# Patient Record
Sex: FEMALE | Race: WHITE | Employment: FULL TIME | URBAN - METROPOLITAN AREA
[De-identification: names, ages, dates, MRNs, and addresses within clinical notes are randomized per-mention and may not be internally consistent; named-entity substitution may affect disease eponyms.]

---

## 2017-09-25 ENCOUNTER — TELEPHONE (OUTPATIENT)
Dept: INTERNAL MEDICINE CLINIC | Facility: CLINIC | Age: 62
End: 2017-09-25

## 2017-09-25 NOTE — TELEPHONE ENCOUNTER
Incoming (mail or fax):  fax  Received from:  [facility]  Documentation given to:  triage

## 2017-09-27 ENCOUNTER — OFFICE VISIT (OUTPATIENT)
Dept: INTERNAL MEDICINE CLINIC | Facility: CLINIC | Age: 62
End: 2017-09-27

## 2017-09-27 VITALS
HEART RATE: 64 BPM | HEIGHT: 63 IN | RESPIRATION RATE: 12 BRPM | SYSTOLIC BLOOD PRESSURE: 100 MMHG | TEMPERATURE: 99 F | WEIGHT: 168 LBS | DIASTOLIC BLOOD PRESSURE: 74 MMHG | BODY MASS INDEX: 29.77 KG/M2

## 2017-09-27 DIAGNOSIS — Z00.00 PHYSICAL EXAM, ANNUAL: Primary | ICD-10-CM

## 2017-09-27 DIAGNOSIS — Z01.419 VISIT FOR PELVIC EXAM: ICD-10-CM

## 2017-09-27 DIAGNOSIS — Z78.0 POSTMENOPAUSAL: ICD-10-CM

## 2017-09-27 PROCEDURE — 87624 HPV HI-RISK TYP POOLED RSLT: CPT | Performed by: INTERNAL MEDICINE

## 2017-09-27 PROCEDURE — 99396 PREV VISIT EST AGE 40-64: CPT | Performed by: INTERNAL MEDICINE

## 2017-09-27 PROCEDURE — 88175 CYTOPATH C/V AUTO FLUID REDO: CPT | Performed by: INTERNAL MEDICINE

## 2017-09-27 RX ORDER — PHENOL 1.4 %
AEROSOL, SPRAY (ML) MUCOUS MEMBRANE NIGHTLY
COMMUNITY

## 2017-09-27 NOTE — PROGRESS NOTES
[facility]    CHIEF COMPLAINT: Patient presents with:  Routine Physical: 10/3/12 normal pap, no HPV        HPI:   Shashi Delacruz is a 63year old female who presents for a complete physical exam. Symptoms: denies discharge, itching, burning or density study 04/2008   • Night sweats     menopausal   • Numbness and tingling in hands     upon waking up   • Positive tuberculin    • Snoring    • Spontaneous pneumothorax at 29 yrs old   • Thyroid nodule     right, s/p fna      Past Surgical History: atraumatic, normocephalic,ears and throat are clear  EYES:PERRLA, conjunctiva are clear  NECK: supple,no adenopathy,no bruits  CHEST: no chest tenderness  LUNGS: clear to auscultation  CARDIO: nl s1 and s2, RRR without murmur  GI: good BS's,no masses, HSM

## 2019-02-12 ENCOUNTER — TELEPHONE (OUTPATIENT)
Dept: INTERNAL MEDICINE CLINIC | Facility: CLINIC | Age: 64
End: 2019-02-12

## 2019-02-12 NOTE — TELEPHONE ENCOUNTER
Mammogram report dated 2/12/19 received, 1 year screening recommended. HM updated. Routed to Dr Alva Hoskins for review.

## 2019-02-12 NOTE — TELEPHONE ENCOUNTER
Incoming (mail or fax):  fax  Received from:  [facility]  Documentation given to:  triage

## 2019-09-26 ENCOUNTER — OFFICE VISIT (OUTPATIENT)
Dept: INTERNAL MEDICINE CLINIC | Facility: CLINIC | Age: 64
End: 2019-09-26
Payer: COMMERCIAL

## 2019-09-26 VITALS
SYSTOLIC BLOOD PRESSURE: 100 MMHG | BODY MASS INDEX: 30.35 KG/M2 | HEIGHT: 62.75 IN | TEMPERATURE: 99 F | RESPIRATION RATE: 12 BRPM | WEIGHT: 169.13 LBS | HEART RATE: 72 BPM | DIASTOLIC BLOOD PRESSURE: 62 MMHG

## 2019-09-26 DIAGNOSIS — Z00.00 PHYSICAL EXAM, ANNUAL: Primary | ICD-10-CM

## 2019-09-26 DIAGNOSIS — Z78.0 POSTMENOPAUSAL: ICD-10-CM

## 2019-09-26 DIAGNOSIS — Z12.11 SCREENING FOR COLON CANCER: ICD-10-CM

## 2019-09-26 PROCEDURE — 99396 PREV VISIT EST AGE 40-64: CPT | Performed by: INTERNAL MEDICINE

## 2019-09-26 NOTE — PROGRESS NOTES
[facility]    CHIEF COMPLAINT: Patient presents with:  Routine Physical: 9/27/17-pap. 2/12/19-mammo. 10/24/14-colon,repeat 5 years.           HPI:   Carolina Spann is a 60year old female who presents for a complete physical exam. Symptoms: den nodule     right, s/p fna      Past Surgical History:   Procedure Laterality Date   • COLONOSCOPY  05/2009   • COLONOSCOPY  10/24/14    colon polyps, diverticulosis   • FNA (INDICATE SOURCE BELOW)      thyroid nodule   • TUBAL LIGATION        Family Histor murmur  GI: good BS's,no masses, HSM or tenderness  BREAST: no dominant or suspicious mass, no nipple discharge  GENITAL/URINARY:  External Genitalia:  General appearance; normal, Hair distribution; normal, Lesions absent, Urethral Meatus:  Size normal, Lo

## 2020-01-24 ENCOUNTER — OFFICE VISIT (OUTPATIENT)
Dept: FAMILY MEDICINE CLINIC | Facility: CLINIC | Age: 65
End: 2020-01-24
Payer: COMMERCIAL

## 2020-01-24 VITALS
DIASTOLIC BLOOD PRESSURE: 64 MMHG | HEART RATE: 85 BPM | BODY MASS INDEX: 27.6 KG/M2 | WEIGHT: 150 LBS | OXYGEN SATURATION: 98 % | SYSTOLIC BLOOD PRESSURE: 122 MMHG | TEMPERATURE: 100 F | HEIGHT: 62 IN

## 2020-01-24 DIAGNOSIS — J40 BRONCHITIS: Primary | ICD-10-CM

## 2020-01-24 PROCEDURE — 99213 OFFICE O/P EST LOW 20 MIN: CPT | Performed by: FAMILY MEDICINE

## 2020-01-24 RX ORDER — PREDNISONE 20 MG/1
TABLET ORAL
Qty: 10 TABLET | Refills: 0 | Status: SHIPPED | OUTPATIENT
Start: 2020-01-24 | End: 2020-06-11 | Stop reason: ALTCHOICE

## 2020-01-24 NOTE — PROGRESS NOTES
CHIEF COMPLAINT:   Patient presents with:  URI: when coughing throat hurts, coughing up flem, x 3 days. HPI:   Camilo Craig is a 60year old female who presents for cough for  3  days.   Cough started gradually and is described as tight and tyra fna      Social History:  Social History    Tobacco Use      Smoking status: Former Smoker        Years: 15.00      Smokeless tobacco: Never Used      Tobacco comment: 5-7 cigarettes, quit in 2009    Alcohol use: Yes      Comment: 3 drinks a week    Drug u Tylenol for pain if needed rather than ibuprofen (Motrin/Ibuprofen) or Aleve.     Use OTC meds for comfort as needed--  Use Benadryl at bedtime to reduce drainage and promote rest.  Zyrtec/Claritin/Allegra in the AM to reduce nasal drainage without sedation

## 2020-01-24 NOTE — PATIENT INSTRUCTIONS
Take prednisone-- 2 tabs once daily for 5 days. Take with food. While you are on steroids it is preferred that you take Tylenol for pain if needed rather than ibuprofen (Motrin/Ibuprofen) or Aleve.     Use OTC meds for comfort as needed--  Use Benadryl at

## 2020-02-07 ENCOUNTER — HOSPITAL ENCOUNTER (OUTPATIENT)
Dept: BONE DENSITY | Age: 65
Discharge: HOME | End: 2020-02-07
Attending: INTERNAL MEDICINE
Payer: COMMERCIAL

## 2020-02-07 DIAGNOSIS — Z78.0 POSTMENOPAUSAL: ICD-10-CM

## 2020-02-07 PROCEDURE — 77080 DXA BONE DENSITY AXIAL: CPT | Performed by: INTERNAL MEDICINE

## 2020-02-08 LAB
ABSOLUTE BASOPHILS: 74 CELLS/UL (ref 0–200)
ABSOLUTE EOSINOPHILS: 197 CELLS/UL (ref 15–500)
ABSOLUTE LYMPHOCYTES: 2280 CELLS/UL (ref 850–3900)
ABSOLUTE MONOCYTES: 828 CELLS/UL (ref 200–950)
ABSOLUTE NEUTROPHILS: 4822 CELLS/UL (ref 1500–7800)
ALBUMIN/GLOBULIN RATIO: 1.9 (CALC) (ref 1–2.5)
ALBUMIN: 4.2 G/DL (ref 3.6–5.1)
ALKALINE PHOSPHATASE: 41 U/L (ref 37–153)
ALT: 18 U/L (ref 6–29)
AST: 16 U/L (ref 10–35)
BASOPHILS: 0.9 %
BILIRUBIN, TOTAL: 0.4 MG/DL (ref 0.2–1.2)
BUN: 16 MG/DL (ref 7–25)
CALCIUM: 9.2 MG/DL (ref 8.6–10.4)
CARBON DIOXIDE: 29 MMOL/L (ref 20–32)
CHLORIDE: 106 MMOL/L (ref 98–110)
CHOL/HDLC RATIO: 3.2 (CALC)
CHOLESTEROL, TOTAL: 193 MG/DL
CREATININE: 0.73 MG/DL (ref 0.5–0.99)
EGFR IF AFRICN AM: 102 ML/MIN/1.73M2
EGFR IF NONAFRICN AM: 88 ML/MIN/1.73M2
EOSINOPHILS: 2.4 %
GLOBULIN: 2.2 G/DL (CALC) (ref 1.9–3.7)
GLUCOSE: 91 MG/DL (ref 65–99)
HDL CHOLESTEROL: 60 MG/DL
HEMATOCRIT: 44.7 % (ref 35–45)
HEMOGLOBIN: 14.9 G/DL (ref 11.7–15.5)
LDL-CHOLESTEROL: 115 MG/DL (CALC)
LYMPHOCYTES: 27.8 %
MCH: 29.2 PG (ref 27–33)
MCHC: 33.3 G/DL (ref 32–36)
MCV: 87.6 FL (ref 80–100)
MONOCYTES: 10.1 %
MPV: 10.9 FL (ref 7.5–12.5)
NEUTROPHILS: 58.8 %
NON-HDL CHOLESTEROL: 133 MG/DL (CALC)
PLATELET COUNT: 285 THOUSAND/UL (ref 140–400)
POTASSIUM: 5 MMOL/L (ref 3.5–5.3)
PROTEIN, TOTAL: 6.4 G/DL (ref 6.1–8.1)
RDW: 13.3 % (ref 11–15)
RED BLOOD CELL COUNT: 5.1 MILLION/UL (ref 3.8–5.1)
SODIUM: 142 MMOL/L (ref 135–146)
TRIGLYCERIDES: 79 MG/DL
TSH W/REFLEX TO FT4: 2.08 MIU/L (ref 0.4–4.5)
WHITE BLOOD CELL COUNT: 8.2 THOUSAND/UL (ref 3.8–10.8)

## 2020-05-31 ENCOUNTER — PATIENT MESSAGE (OUTPATIENT)
Dept: INTERNAL MEDICINE CLINIC | Facility: CLINIC | Age: 65
End: 2020-05-31

## 2020-06-01 NOTE — TELEPHONE ENCOUNTER
From: Turner Rucker  To: Destiny Maravilla MD  Sent: 5/31/2020 10:16 AM CDT  Subject: Non-Urgent Medical Question    Hi Dr Levi -  I would like to take a Covid antibody rest. I was so very sick towards the end of January, I went to the walk in clinic where t

## 2020-06-09 ENCOUNTER — OFFICE VISIT (OUTPATIENT)
Dept: FAMILY MEDICINE CLINIC | Facility: CLINIC | Age: 65
End: 2020-06-09
Payer: COMMERCIAL

## 2020-06-09 VITALS
WEIGHT: 175 LBS | BODY MASS INDEX: 32.2 KG/M2 | TEMPERATURE: 98 F | HEART RATE: 69 BPM | SYSTOLIC BLOOD PRESSURE: 113 MMHG | DIASTOLIC BLOOD PRESSURE: 65 MMHG | OXYGEN SATURATION: 99 % | HEIGHT: 62 IN

## 2020-06-09 DIAGNOSIS — Z91.038 ALLERGIC REACTION TO INSECT BITE: Primary | ICD-10-CM

## 2020-06-09 DIAGNOSIS — L03.116 CELLULITIS OF LEFT LOWER EXTREMITY: ICD-10-CM

## 2020-06-09 PROCEDURE — 99213 OFFICE O/P EST LOW 20 MIN: CPT | Performed by: PHYSICIAN ASSISTANT

## 2020-06-09 RX ORDER — PREDNISONE 10 MG/1
TABLET ORAL
Qty: 20 TABLET | Refills: 0 | Status: SHIPPED | OUTPATIENT
Start: 2020-06-09 | End: 2021-05-18

## 2020-06-09 RX ORDER — SULFAMETHOXAZOLE AND TRIMETHOPRIM 800; 160 MG/1; MG/1
1 TABLET ORAL 2 TIMES DAILY
Qty: 14 TABLET | Refills: 0 | Status: SHIPPED | OUTPATIENT
Start: 2020-06-09 | End: 2020-06-16

## 2020-06-09 NOTE — PATIENT INSTRUCTIONS
Patient Declined AVS    Verbal Instructions given      1. Bactrim  2. Prednisone  3. Follow up with PCP in 48 hours  4.  Worse to ER

## 2020-06-09 NOTE — PROGRESS NOTES
CHIEF COMPLAINT:     No chief complaint on file. HPI:   Turner Rucker is a 63year old female who presents with complaints of reaction to insect bite.   The patient reports 4 days ago the patient was bit by a bug on the back of her left ankle and l loss 2015   • History of bone density study 04/2008   • Night sweats     menopausal   • Numbness and tingling in hands     upon waking up   • Positive tuberculin    • Sleep disturbance 2007    Night sweats cause me to wake frequently   • Snoring    • Spont fluid, bony landmarks normal.    NOSE: nostrils patent, no discharge, nasal mucosa pink and not inflamed. No sinus tenderness. THROAT: oral mucosa pink, moist and intact. No visible dental caries. Posterior pharynx without erythema or exudates.   NECK: vanegas

## 2020-06-11 ENCOUNTER — OFFICE VISIT (OUTPATIENT)
Dept: INTERNAL MEDICINE CLINIC | Facility: CLINIC | Age: 65
End: 2020-06-11
Payer: COMMERCIAL

## 2020-06-11 VITALS
SYSTOLIC BLOOD PRESSURE: 104 MMHG | TEMPERATURE: 98 F | RESPIRATION RATE: 14 BRPM | OXYGEN SATURATION: 98 % | HEART RATE: 56 BPM | DIASTOLIC BLOOD PRESSURE: 64 MMHG | HEIGHT: 62 IN | WEIGHT: 172.81 LBS | BODY MASS INDEX: 31.8 KG/M2

## 2020-06-11 DIAGNOSIS — L03.116 CELLULITIS OF LEFT LOWER EXTREMITY: ICD-10-CM

## 2020-06-11 DIAGNOSIS — Z91.038 ALLERGIC REACTION TO INSECT BITE: Primary | ICD-10-CM

## 2020-06-11 PROCEDURE — 99213 OFFICE O/P EST LOW 20 MIN: CPT | Performed by: NURSE PRACTITIONER

## 2020-06-11 NOTE — PATIENT INSTRUCTIONS
Continue meds as directed. Take the prednisone in am with food.     Take the bactrim with food and take a OTC probiotic or eat yogurt    Monitor for side effects    F/U when your annual PE is due

## 2020-06-11 NOTE — PROGRESS NOTES
Rena Lester is a 63year old female. CHIEF COMPLAINT   [facility] follow up on bug bite allergy, cellulitis     HPI:   The patient states that on Sunday she had a bug bite to her left lower heel. she noticed it and scratched it a little bit.   That evening s • Multiple Vitamin (MULTIVITAMIN OR) Take  by mouth daily.         Past Medical History:   Diagnosis Date   • Body piercing Ears   • Carpal tunnel syndrome    • Colon polyps 05/05/09   • Depression    • Diverticulitis of colon 05/05/09    left   • Hearing l EXTREMITIES: no cyanosis, clubbing or edema. No edema noted to her bilateral lower extremities, no calf tenderness.   NEURO: Oriented times three,cranial nerves are grossly intact,motor and sensory are grossly intact          LABS:      Lab Results   [place] -Continue Bactrim as directed until the antibiotic is complete. She was instructed to take it with food and use either probiotic or eat yogurt to prevent diarrhea. She denies any side effects so far.   There is no drainage from the left heel wound but the

## 2020-06-30 ENCOUNTER — ORDER TRANSCRIPTION (OUTPATIENT)
Dept: ADMINISTRATIVE | Facility: HOSPITAL | Age: 65
End: 2020-06-30

## 2020-06-30 DIAGNOSIS — Z12.31 ENCOUNTER FOR SCREENING MAMMOGRAM FOR MALIGNANT NEOPLASM OF BREAST: Primary | ICD-10-CM

## 2020-07-15 ENCOUNTER — HOSPITAL ENCOUNTER (OUTPATIENT)
Dept: MAMMOGRAPHY | Age: 65
Discharge: HOME | End: 2020-07-15
Attending: INTERNAL MEDICINE
Payer: COMMERCIAL

## 2020-07-15 DIAGNOSIS — Z12.31 ENCOUNTER FOR SCREENING MAMMOGRAM FOR MALIGNANT NEOPLASM OF BREAST: ICD-10-CM

## 2020-07-15 PROCEDURE — 77067 SCR MAMMO BI INCL CAD: CPT | Performed by: INTERNAL MEDICINE

## 2020-07-15 PROCEDURE — 77063 BREAST TOMOSYNTHESIS BI: CPT | Performed by: INTERNAL MEDICINE

## 2020-08-05 ENCOUNTER — HOSPITAL ENCOUNTER (OUTPATIENT)
Dept: MAMMOGRAPHY | Facility: HOSPITAL | Age: 65
Discharge: HOME | End: 2020-08-05
Attending: INTERNAL MEDICINE
Payer: COMMERCIAL

## 2020-08-05 DIAGNOSIS — R92.2 INCONCLUSIVE MAMMOGRAM: ICD-10-CM

## 2020-08-05 PROCEDURE — 77061 BREAST TOMOSYNTHESIS UNI: CPT | Performed by: INTERNAL MEDICINE

## 2020-08-05 PROCEDURE — 77065 DX MAMMO INCL CAD UNI: CPT | Performed by: INTERNAL MEDICINE

## 2020-08-05 PROCEDURE — 76642 ULTRASOUND BREAST LIMITED: CPT | Performed by: INTERNAL MEDICINE

## 2020-11-22 ENCOUNTER — PATIENT MESSAGE (OUTPATIENT)
Dept: INTERNAL MEDICINE CLINIC | Facility: CLINIC | Age: 65
End: 2020-11-22

## 2020-11-23 NOTE — TELEPHONE ENCOUNTER
From: Reinier Self  To: Keisha Dixon MD  Sent: 11/22/2020 9:47 PM CST  Subject: Other    For records

## 2021-03-09 ENCOUNTER — PATIENT MESSAGE (OUTPATIENT)
Dept: INTERNAL MEDICINE CLINIC | Facility: CLINIC | Age: 66
End: 2021-03-09

## 2021-03-09 NOTE — TELEPHONE ENCOUNTER
From: Ruben Baer  To: Hung Carcamo MD  Sent: 3/9/2021 11:34 AM CST  Subject: Non-Urgent Medical Question    Good Morning- I would like to know if I am eligible to be considered at high risk, and be able to get a vaccine for Covid now, (not 67 yet) I

## 2021-03-10 NOTE — TELEPHONE ENCOUNTER
Not sure if that qualifies but she has a bmi of 31 so that would qualify her to get it sec to high risk condition of obesity.

## 2021-03-10 NOTE — TELEPHONE ENCOUNTER
Vnae [place],     It is unknown if you would qualify for a past pneumothorax since it is not a chronic lung condition. After reviewing your chart you would qualify for the vaccine based on the BMI category.    I ca

## 2021-05-18 ENCOUNTER — OFFICE VISIT (OUTPATIENT)
Dept: INTERNAL MEDICINE CLINIC | Facility: CLINIC | Age: 66
End: 2021-05-18
Payer: COMMERCIAL

## 2021-05-18 VITALS
DIASTOLIC BLOOD PRESSURE: 70 MMHG | HEART RATE: 72 BPM | BODY MASS INDEX: 31.52 KG/M2 | RESPIRATION RATE: 12 BRPM | HEIGHT: 62.5 IN | TEMPERATURE: 98 F | SYSTOLIC BLOOD PRESSURE: 110 MMHG | WEIGHT: 175.69 LBS

## 2021-05-18 DIAGNOSIS — Z00.00 PHYSICAL EXAM, ANNUAL: ICD-10-CM

## 2021-05-18 DIAGNOSIS — Z12.31 SCREENING MAMMOGRAM, ENCOUNTER FOR: Primary | ICD-10-CM

## 2021-05-18 DIAGNOSIS — Z23 NEED FOR VACCINATION: ICD-10-CM

## 2021-05-18 PROCEDURE — 90471 IMMUNIZATION ADMIN: CPT | Performed by: INTERNAL MEDICINE

## 2021-05-18 PROCEDURE — 3078F DIAST BP <80 MM HG: CPT | Performed by: INTERNAL MEDICINE

## 2021-05-18 PROCEDURE — 90715 TDAP VACCINE 7 YRS/> IM: CPT | Performed by: INTERNAL MEDICINE

## 2021-05-18 PROCEDURE — 3074F SYST BP LT 130 MM HG: CPT | Performed by: INTERNAL MEDICINE

## 2021-05-18 PROCEDURE — 99396 PREV VISIT EST AGE 40-64: CPT | Performed by: INTERNAL MEDICINE

## 2021-05-18 PROCEDURE — 3008F BODY MASS INDEX DOCD: CPT | Performed by: INTERNAL MEDICINE

## 2021-05-18 NOTE — PROGRESS NOTES
[facility]    CHIEF COMPLAINT: Patient presents with:  Routine Physical: 9/27/17-normal pap, neg HPV. 7/15/20-mammo.  10/24/14-Colon-repeat 10 years        HPI:   Toya Adams is a 61year old female who presents for a complete physical exam. Procedure Laterality Date   • COLONOSCOPY  05/2009   • COLONOSCOPY  10/24/14    colon polyps, diverticulosis   • COLONOSCOPY     • FNA (INDICATE SOURCE BELOW)      thyroid nodule   • BENSON BIOPSY STEREO NODULE 1 SITE LEFT (CPT=19081)      aprox 10 yrs ago throat are clear  EYES:PERRLA, conjunctiva are clear  NECK: supple,no adenopathy,no bruits  CHEST: no chest tenderness  LUNGS: clear to auscultation  CARDIO: nl s1 and s2, RRR without murmur  GI: good BS's,no masses, HSM or tenderness  BREAST: no dominant for a complete physical exam.   1. Screening mammogram, encounter for  - [facility] STEPHANIA 2D+3D SCREENING BILAT (CPT=77067/00954); Future    2. Physical exam, annual  Pap done 2017 negative with negative hpv. Pelvic and breast exam done today. Do labs.    mammo i

## 2021-06-04 ENCOUNTER — MED REC SCAN ONLY (OUTPATIENT)
Dept: INTERNAL MEDICINE CLINIC | Facility: CLINIC | Age: 66
End: 2021-06-04

## 2021-06-04 ENCOUNTER — LAB ENCOUNTER (OUTPATIENT)
Dept: LAB | Age: 66
End: 2021-06-04
Attending: INTERNAL MEDICINE
Payer: COMMERCIAL

## 2021-06-04 DIAGNOSIS — Z00.00 PHYSICAL EXAM, ANNUAL: ICD-10-CM

## 2021-06-04 PROCEDURE — 83036 HEMOGLOBIN GLYCOSYLATED A1C: CPT | Performed by: INTERNAL MEDICINE

## 2021-06-04 PROCEDURE — 80050 GENERAL HEALTH PANEL: CPT | Performed by: INTERNAL MEDICINE

## 2021-06-04 PROCEDURE — 80061 LIPID PANEL: CPT | Performed by: INTERNAL MEDICINE

## 2021-06-08 NOTE — PROGRESS NOTES
Spoke to pt. Made aware of results & recommendations. Pt states she already read the [facility] Result Comment. Pt voiced understanding. Noted pt only clicked into the CMP result note.   \"Patient Communication Seen by patient Neisha Eugene on 6/4/20

## 2021-10-12 ENCOUNTER — HOSPITAL ENCOUNTER (OUTPATIENT)
Dept: MAMMOGRAPHY | Age: 66
Discharge: HOME | End: 2021-10-12
Attending: INTERNAL MEDICINE
Payer: COMMERCIAL

## 2021-10-12 DIAGNOSIS — Z12.31 SCREENING MAMMOGRAM, ENCOUNTER FOR: ICD-10-CM

## 2021-10-12 PROCEDURE — 77067 SCR MAMMO BI INCL CAD: CPT | Performed by: INTERNAL MEDICINE

## 2021-10-12 PROCEDURE — 77063 BREAST TOMOSYNTHESIS BI: CPT | Performed by: INTERNAL MEDICINE

## 2021-10-15 ENCOUNTER — TELEPHONE (OUTPATIENT)
Dept: MAMMOGRAPHY | Facility: HOSPITAL | Age: 66
End: 2021-10-15

## 2021-10-15 ENCOUNTER — HOSPITAL ENCOUNTER (OUTPATIENT)
Dept: MAMMOGRAPHY | Facility: HOSPITAL | Age: 66
Discharge: HOME | End: 2021-10-15
Attending: INTERNAL MEDICINE
Payer: COMMERCIAL

## 2021-10-15 DIAGNOSIS — R92.2 INCONCLUSIVE MAMMOGRAM: ICD-10-CM

## 2021-10-15 PROCEDURE — 77065 DX MAMMO INCL CAD UNI: CPT | Performed by: INTERNAL MEDICINE

## 2021-10-15 PROCEDURE — 77061 BREAST TOMOSYNTHESIS UNI: CPT | Performed by: INTERNAL MEDICINE

## 2021-10-15 NOTE — TELEPHONE ENCOUNTER
Patient here for breast imaging. Left breast 1 site stereotactic biopsy is recommended by Dr Napoleon Quinteros. Patient left before speaking to RN. Message left for patient to call back for prescreening and education.

## 2021-10-15 NOTE — TELEPHONE ENCOUNTER
This [facility] RN phoned pt re left breast 1 site stereotactic biopsy recommendation by Dr. Jorge Valdivia for calcifications. Procedure reviewed and all questions answered. Reviewed educational handouts.   On the day of the biopsy, pt instructed to take Tylbeverly

## 2021-10-27 ENCOUNTER — HOSPITAL ENCOUNTER (OUTPATIENT)
Dept: MAMMOGRAPHY | Facility: HOSPITAL | Age: 66
Discharge: HOME | End: 2021-10-27
Attending: INTERNAL MEDICINE
Payer: COMMERCIAL

## 2021-10-27 DIAGNOSIS — R92.1 BREAST CALCIFICATIONS: ICD-10-CM

## 2021-10-27 PROCEDURE — 19499 UNLISTED PROCEDURE BREAST: CPT | Performed by: INTERNAL MEDICINE

## 2021-10-27 PROCEDURE — 88305 TISSUE EXAM BY PATHOLOGIST: CPT | Performed by: INTERNAL MEDICINE

## 2021-10-29 ENCOUNTER — PATIENT MESSAGE (OUTPATIENT)
Dept: INTERNAL MEDICINE CLINIC | Facility: CLINIC | Age: 66
End: 2021-10-29

## 2021-10-29 DIAGNOSIS — R92.8 ABNORMAL MAMMOGRAM OF LEFT BREAST: Primary | ICD-10-CM

## 2021-10-29 NOTE — IMAGING NOTE
[place] verified with Dr. Bina Antoine. Pt notified of recent breast bx results. Pt denies any issues with biopsy site--no tenderness, drainage or redness.   Pt informed will need 6 month f/u on left breast. Pt verbalized understanding and emotional support p

## 2021-11-02 NOTE — TELEPHONE ENCOUNTER
From: Adin Rai  To: Moise Lundberg MD  Sent: 10/29/2021 4:24 PM CDT  Subject: Follow up mammogram     Hi- Just saw your note (from Tobias) inmy chart saying to follow up with a mammogram in one year.  The breast nurse at [facility] said I should follow up

## 2021-11-02 NOTE — TELEPHONE ENCOUNTER
Per breast biopsy result note, our office advised pt to follow up with mammogram in 12 months. Pt states was advised to repeat cornelia in 6 months. Per mammography dept 10/29/21 note, rec is 6 months.    Please advise if updated order needs to be placed for 6

## 2021-11-02 NOTE — TELEPHONE ENCOUNTER
Per mammogram report addendem after biopsy results were back they recommended repeat imaging in 10/2022 which would indicate a year (see results under mammogram in imaging).  They recommended 6 months when they called her, I don't know which one is accurate

## 2021-11-02 NOTE — TELEPHONE ENCOUNTER
Spoke to [facility]. States that 6 months IS recommended. She will send for an addendum.   FYI to Dr. Jennifer Garcia

## 2021-11-11 NOTE — TELEPHONE ENCOUNTER
LM with Kyleigh Austin to follow up on addendum. Biopsy still says follow up in 1 year, yet per [place] and what pt was told at [facility], pt should follow up in 6 months.

## 2021-11-18 NOTE — TELEPHONE ENCOUNTER
To Claude Billow,    Following up on addendum to note follow-up due in 6 months as pt was verbally advised, instead of 1 year.

## 2021-11-23 NOTE — TELEPHONE ENCOUNTER
Following up to Estrella's routing message from 5 days ago. Any update? Thanks! Brad Milligan RN 5 days ago     BN    This report is still out for an addendum. We will follow up with the Radiologist today.     Message text

## 2022-06-04 ENCOUNTER — TELEPHONE (OUTPATIENT)
Dept: INTERNAL MEDICINE CLINIC | Facility: CLINIC | Age: 67
End: 2022-06-04

## 2022-06-04 NOTE — TELEPHONE ENCOUNTER
Care Everywhere Records Received    Updated Care Everywhere: yes     Date of Service: 6/4/22    From What Facility: [facility]     Speciality: ENT Dr Author Leach    Type of Record: consult     Document Placed:Dr Ellsworth office in folder

## 2022-06-06 ENCOUNTER — HOSPITAL ENCOUNTER (OUTPATIENT)
Dept: MAMMOGRAPHY | Facility: HOSPITAL | Age: 67
Discharge: HOME | End: 2022-06-06
Attending: INTERNAL MEDICINE
Payer: MEDICARE

## 2022-06-06 DIAGNOSIS — R92.8 ABNORMAL MAMMOGRAM OF LEFT BREAST: ICD-10-CM

## 2022-06-06 PROCEDURE — 77065 DX MAMMO INCL CAD UNI: CPT | Performed by: INTERNAL MEDICINE

## 2022-06-06 PROCEDURE — 77061 BREAST TOMOSYNTHESIS UNI: CPT | Performed by: INTERNAL MEDICINE

## 2022-06-27 ENCOUNTER — OFFICE VISIT (OUTPATIENT)
Dept: INTERNAL MEDICINE CLINIC | Facility: CLINIC | Age: 67
End: 2022-06-27
Payer: MEDICARE

## 2022-06-27 VITALS
RESPIRATION RATE: 12 BRPM | DIASTOLIC BLOOD PRESSURE: 66 MMHG | TEMPERATURE: 98 F | BODY MASS INDEX: 30.62 KG/M2 | HEIGHT: 62.5 IN | WEIGHT: 170.63 LBS | SYSTOLIC BLOOD PRESSURE: 100 MMHG | HEART RATE: 64 BPM

## 2022-06-27 DIAGNOSIS — Z13.220 ENCOUNTER FOR LIPID SCREENING FOR CARDIOVASCULAR DISEASE: ICD-10-CM

## 2022-06-27 DIAGNOSIS — Z00.00 ENCOUNTER FOR ANNUAL HEALTH EXAMINATION: ICD-10-CM

## 2022-06-27 DIAGNOSIS — Z13.6 ENCOUNTER FOR LIPID SCREENING FOR CARDIOVASCULAR DISEASE: ICD-10-CM

## 2022-06-27 DIAGNOSIS — Z01.419 VISIT FOR PELVIC EXAM: ICD-10-CM

## 2022-06-27 DIAGNOSIS — Z12.31 ENCOUNTER FOR SCREENING MAMMOGRAM FOR BREAST CANCER: ICD-10-CM

## 2022-06-27 DIAGNOSIS — R73.03 PRE-DIABETES: ICD-10-CM

## 2022-06-27 DIAGNOSIS — Z23 NEED FOR VACCINATION: ICD-10-CM

## 2022-06-27 DIAGNOSIS — Z11.59 NEED FOR HEPATITIS C SCREENING TEST: ICD-10-CM

## 2022-06-27 DIAGNOSIS — E04.1 THYROID NODULE: ICD-10-CM

## 2022-07-11 LAB — HPV I/H RISK 1 DNA SPEC QL NAA+PROBE: NEGATIVE

## 2022-07-13 ENCOUNTER — LAB ENCOUNTER (OUTPATIENT)
Dept: LAB | Age: 67
End: 2022-07-13
Attending: INTERNAL MEDICINE
Payer: MEDICARE

## 2022-07-13 DIAGNOSIS — Z11.59 NEED FOR HEPATITIS C SCREENING TEST: ICD-10-CM

## 2022-07-13 DIAGNOSIS — E04.1 THYROID NODULE: ICD-10-CM

## 2022-07-13 DIAGNOSIS — R73.03 PRE-DIABETES: ICD-10-CM

## 2022-07-13 LAB
ALBUMIN SERPL-MCNC: 3.7 G/DL (ref 3.4–5)
ALBUMIN/GLOB SERPL: 1 {RATIO} (ref 1–2)
ALP LIVER SERPL-CCNC: 51 U/L
ALT SERPL-CCNC: 28 U/L
ANION GAP SERPL CALC-SCNC: 6 MMOL/L (ref 0–18)
AST SERPL-CCNC: 19 U/L (ref 15–37)
BILIRUB SERPL-MCNC: 0.5 MG/DL (ref 0.1–2)
BUN BLD-MCNC: 10 MG/DL (ref 7–18)
CALCIUM BLD-MCNC: 9.3 MG/DL (ref 8.5–10.1)
CHLORIDE SERPL-SCNC: 107 MMOL/L (ref 98–112)
CHOLEST SERPL-MCNC: 192 MG/DL (ref ?–200)
CO2 SERPL-SCNC: 25 MMOL/L (ref 21–32)
CREAT BLD-MCNC: 0.76 MG/DL
EST. AVERAGE GLUCOSE BLD GHB EST-MCNC: 120 MG/DL (ref 68–126)
FASTING PATIENT LIPID ANSWER: YES
FASTING STATUS PATIENT QL REPORTED: YES
GLOBULIN PLAS-MCNC: 3.6 G/DL (ref 2.8–4.4)
GLUCOSE BLD-MCNC: 106 MG/DL (ref 70–99)
HBA1C MFR BLD: 5.8 % (ref ?–5.7)
HCV AB SERPL QL IA: NONREACTIVE
HDLC SERPL-MCNC: 65 MG/DL (ref 40–59)
LDLC SERPL CALC-MCNC: 113 MG/DL (ref ?–100)
NONHDLC SERPL-MCNC: 127 MG/DL (ref ?–130)
OSMOLALITY SERPL CALC.SUM OF ELEC: 285 MOSM/KG (ref 275–295)
POTASSIUM SERPL-SCNC: 4.2 MMOL/L (ref 3.5–5.1)
PROT SERPL-MCNC: 7.3 G/DL (ref 6.4–8.2)
SODIUM SERPL-SCNC: 138 MMOL/L (ref 136–145)
TRIGL SERPL-MCNC: 75 MG/DL (ref 30–149)
TSI SER-ACNC: 1.71 MIU/ML (ref 0.36–3.74)
VLDLC SERPL CALC-MCNC: 13 MG/DL (ref 0–30)

## 2022-07-13 PROCEDURE — 84443 ASSAY THYROID STIM HORMONE: CPT

## 2022-07-13 PROCEDURE — 86803 HEPATITIS C AB TEST: CPT

## 2022-07-13 PROCEDURE — 80061 LIPID PANEL: CPT | Performed by: INTERNAL MEDICINE

## 2022-07-13 PROCEDURE — 36415 COLL VENOUS BLD VENIPUNCTURE: CPT

## 2022-07-13 PROCEDURE — 83036 HEMOGLOBIN GLYCOSYLATED A1C: CPT

## 2022-07-13 PROCEDURE — 80053 COMPREHEN METABOLIC PANEL: CPT | Performed by: INTERNAL MEDICINE

## 2022-07-14 ENCOUNTER — TELEPHONE (OUTPATIENT)
Dept: INTERNAL MEDICINE CLINIC | Facility: CLINIC | Age: 67
End: 2022-07-14

## 2022-07-14 NOTE — TELEPHONE ENCOUNTER
Patient returned call regarding test results. Triage unavailable to take call and patient unavailable the rest of the day. Patient will call back 7/15/22.

## 2022-07-27 PROBLEM — K57.30 DIVERTICULOSIS, SIGMOID: Status: ACTIVE | Noted: 2022-07-27

## 2022-07-27 PROBLEM — Z86.0101 HISTORY OF ADENOMATOUS POLYP OF COLON: Status: ACTIVE | Noted: 2022-07-27

## 2022-07-27 PROBLEM — K64.8 INTERNAL HEMORRHOIDS: Status: ACTIVE | Noted: 2022-07-27

## 2022-07-27 PROBLEM — Z86.010 HISTORY OF ADENOMATOUS POLYP OF COLON: Status: ACTIVE | Noted: 2022-07-27

## 2022-08-19 ENCOUNTER — PATIENT MESSAGE (OUTPATIENT)
Dept: INTERNAL MEDICINE CLINIC | Facility: CLINIC | Age: 67
End: 2022-08-19

## 2022-08-23 NOTE — TELEPHONE ENCOUNTER
From: Carlos Wilson  To: Carmelo Fair MD  Sent: 8/19/2022 1:22 PM CDT  Subject: USThyroid Results from [facility] 6/20/22    Hi- I have merged [facility] my chart with [facility]. Can you let Dr Yesi Rose know these test results are there and that Dr Geeta Berg wants the US repeated in one year to see if the nodules have changed before doing a FNA. Thank you!
Left detailed message per hipaa informing pt to follow-wp w dr Elvi Gaines as recommended in 1 year   Advised to call the office with any additional questions or concerns
Noted. Reviewed us results which recommend a biopsy. I see her note that dr. Frankey Kindler recommended a 1 year follow up. I will defer to his judgement on this as he is managing it. She should follow up with dr. Frankey Kindler as recommended.
Please see pt message below as she has linked her [facility] account so we can view her ultrasound results.
26.5

## 2022-10-19 ENCOUNTER — HOSPITAL ENCOUNTER (OUTPATIENT)
Dept: MAMMOGRAPHY | Age: 67
Discharge: HOME | End: 2022-10-19
Attending: INTERNAL MEDICINE
Payer: MEDICARE

## 2022-10-19 DIAGNOSIS — Z12.31 ENCOUNTER FOR SCREENING MAMMOGRAM FOR BREAST CANCER: ICD-10-CM

## 2022-10-19 PROCEDURE — 77063 BREAST TOMOSYNTHESIS BI: CPT | Performed by: INTERNAL MEDICINE

## 2022-10-19 PROCEDURE — 77067 SCR MAMMO BI INCL CAD: CPT | Performed by: INTERNAL MEDICINE

## 2022-11-02 ENCOUNTER — HOSPITAL ENCOUNTER (OUTPATIENT)
Dept: MAMMOGRAPHY | Facility: HOSPITAL | Age: 67
Discharge: HOME | End: 2022-11-02
Attending: INTERNAL MEDICINE
Payer: MEDICARE

## 2022-11-02 DIAGNOSIS — R92.2 INCONCLUSIVE MAMMOGRAM: ICD-10-CM

## 2022-11-02 PROCEDURE — 77065 DX MAMMO INCL CAD UNI: CPT | Performed by: INTERNAL MEDICINE

## 2022-11-02 PROCEDURE — 77061 BREAST TOMOSYNTHESIS UNI: CPT | Performed by: INTERNAL MEDICINE

## 2022-11-02 PROCEDURE — 76642 ULTRASOUND BREAST LIMITED: CPT | Performed by: INTERNAL MEDICINE

## 2022-11-03 ENCOUNTER — TELEPHONE (OUTPATIENT)
Dept: INTERNAL MEDICINE CLINIC | Facility: CLINIC | Age: 67
End: 2022-11-03

## 2022-11-03 DIAGNOSIS — R92.8 ABNORMAL MAMMOGRAM: Primary | ICD-10-CM

## 2022-11-03 NOTE — TELEPHONE ENCOUNTER
Incoming (mail or fax):  fax  [facility] from:  [facility]  Documentation given to:  Triage in basket     Needs to be signed and returned

## 2022-11-15 ENCOUNTER — MED REC SCAN ONLY (OUTPATIENT)
Dept: INTERNAL MEDICINE CLINIC | Facility: CLINIC | Age: 67
End: 2022-11-15

## 2022-11-16 ENCOUNTER — HOSPITAL ENCOUNTER (OUTPATIENT)
Dept: MRI IMAGING | Facility: HOSPITAL | Age: 67
Discharge: HOME | End: 2022-11-16
Attending: INTERNAL MEDICINE
Payer: MEDICARE

## 2022-11-16 DIAGNOSIS — R92.2 INCONCLUSIVE MAMMOGRAM: ICD-10-CM

## 2022-11-16 PROCEDURE — A9575 INJ GADOTERATE MEGLUMI 0.1ML: HCPCS | Performed by: INTERNAL MEDICINE

## 2022-11-16 PROCEDURE — 77049 MRI BREAST C-+ W/CAD BI: CPT | Performed by: INTERNAL MEDICINE

## 2022-11-16 RX ORDER — GADOTERATE MEGLUMINE 376.9 MG/ML
20 INJECTION INTRAVENOUS
Status: COMPLETED | OUTPATIENT
Start: 2022-11-16 | End: 2022-11-16

## 2022-11-16 RX ADMIN — GADOTERATE MEGLUMINE 14 ML: 376.9 INJECTION INTRAVENOUS at 19:05:00

## 2022-11-17 ENCOUNTER — TELEPHONE (OUTPATIENT)
Dept: INTERNAL MEDICINE CLINIC | Facility: CLINIC | Age: 67
End: 2022-11-17

## 2022-11-17 DIAGNOSIS — R92.8 OTHER ABNORMAL AND INCONCLUSIVE FINDINGS ON DIAGNOSTIC IMAGING OF BREAST: ICD-10-CM

## 2022-11-17 DIAGNOSIS — R93.89 ABNORMAL MRI: Primary | ICD-10-CM

## 2022-11-17 NOTE — TELEPHONE ENCOUNTER
Incoming (mail or fax):  fax  [facility]  [facility]   Documentation given to:  Triage in basket     Needs to be signed and returned

## 2022-11-18 DIAGNOSIS — R93.89 ABNORMAL MRI: ICD-10-CM

## 2022-11-18 DIAGNOSIS — R92.8 ABNORMAL MAMMOGRAM OF LEFT BREAST: Primary | ICD-10-CM

## 2022-11-18 NOTE — TELEPHONE ENCOUNTER
Received order request from [facility] for ultrasound due to abnormal mri   Mri results attached   It looks like an order was already placed for this   lmtcb

## 2022-11-18 NOTE — TELEPHONE ENCOUNTER
Kathrin Ross called back  She stated they need another order for us left breast, they have to keep asking since the mri was already done   Order in dr liza pepe for review   Pended it electronically as well

## 2022-11-28 ENCOUNTER — HOSPITAL ENCOUNTER (OUTPATIENT)
Dept: MAMMOGRAPHY | Facility: HOSPITAL | Age: 67
Discharge: HOME | End: 2022-11-28
Attending: INTERNAL MEDICINE
Payer: MEDICARE

## 2022-11-28 DIAGNOSIS — R93.89 ABNORMAL MRI: ICD-10-CM

## 2022-11-28 DIAGNOSIS — R92.8 ABNORMAL MAMMOGRAM OF LEFT BREAST: ICD-10-CM

## 2022-11-28 PROCEDURE — 76642 ULTRASOUND BREAST LIMITED: CPT | Performed by: INTERNAL MEDICINE

## 2022-11-30 DIAGNOSIS — R92.8 ABNORMAL MAMMOGRAM OF LEFT BREAST: Primary | ICD-10-CM

## 2022-11-30 DIAGNOSIS — R91.1 PULMONARY NODULE 1 CM OR GREATER IN DIAMETER: Primary | ICD-10-CM

## 2022-12-01 ENCOUNTER — TELEPHONE (OUTPATIENT)
Dept: INTERNAL MEDICINE CLINIC | Facility: CLINIC | Age: 67
End: 2022-12-01

## 2022-12-01 NOTE — TELEPHONE ENCOUNTER
Talked to pt and pt's ingrown hair is raised, red, painful to touch and draining blood and pus. Pt noticed  it 3 days ago and doing sitz bath and applying warm compress and it feels better today.  Please advise

## 2022-12-01 NOTE — TELEPHONE ENCOUNTER
Pt called and wanted to discuss possible ingrown hair on pelvic area not sure if its something else and how to treat.  Please advise

## 2022-12-08 ENCOUNTER — TELEPHONE (OUTPATIENT)
Dept: INTERNAL MEDICINE CLINIC | Facility: CLINIC | Age: 67
End: 2022-12-08

## 2022-12-08 NOTE — TELEPHONE ENCOUNTER
[facility] testing dept called to give order clarification. Cosme  stated it ct chest should just be with contrast. Without does not make sense.  Please advise

## 2022-12-12 ENCOUNTER — HOSPITAL ENCOUNTER (OUTPATIENT)
Dept: CT IMAGING | Facility: HOSPITAL | Age: 67
Discharge: HOME | End: 2022-12-12
Attending: INTERNAL MEDICINE
Payer: MEDICARE

## 2022-12-12 DIAGNOSIS — R91.1 PULMONARY NODULE 1 CM OR GREATER IN DIAMETER: ICD-10-CM

## 2022-12-12 LAB
CREAT BLD-MCNC: 0.7 MG/DL
GFR SERPLBLD BASED ON 1.73 SQ M-ARVRAT: 96 ML/MIN/1.73M2 (ref 60–?)

## 2022-12-12 PROCEDURE — 82565 ASSAY OF CREATININE: CPT

## 2022-12-12 PROCEDURE — 71260 CT THORAX DX C+: CPT | Performed by: INTERNAL MEDICINE

## 2022-12-27 ENCOUNTER — OFFICE VISIT (OUTPATIENT)
Dept: SURGERY | Facility: CLINIC | Age: 67
End: 2022-12-27
Payer: MEDICARE

## 2022-12-27 VITALS
RESPIRATION RATE: 18 BRPM | DIASTOLIC BLOOD PRESSURE: 75 MMHG | SYSTOLIC BLOOD PRESSURE: 137 MMHG | BODY MASS INDEX: 30 KG/M2 | HEART RATE: 76 BPM | OXYGEN SATURATION: 97 % | WEIGHT: 164 LBS

## 2022-12-27 DIAGNOSIS — R92.8 ABNORMAL MAMMOGRAM OF LEFT BREAST: Primary | ICD-10-CM

## 2022-12-27 PROCEDURE — 99204 OFFICE O/P NEW MOD 45 MIN: CPT | Performed by: SURGERY

## 2022-12-29 ENCOUNTER — OFFICE VISIT (OUTPATIENT)
Facility: CLINIC | Age: 67
End: 2022-12-29
Payer: MEDICARE

## 2022-12-29 VITALS
SYSTOLIC BLOOD PRESSURE: 108 MMHG | HEIGHT: 62 IN | RESPIRATION RATE: 16 BRPM | DIASTOLIC BLOOD PRESSURE: 60 MMHG | HEART RATE: 77 BPM | BODY MASS INDEX: 30.73 KG/M2 | OXYGEN SATURATION: 98 % | WEIGHT: 167 LBS

## 2022-12-29 DIAGNOSIS — J92.9 PLEURAL PLAQUE WITHOUT ASBESTOS: Primary | ICD-10-CM

## 2022-12-29 DIAGNOSIS — R06.09 DOE (DYSPNEA ON EXERTION): ICD-10-CM

## 2022-12-29 DIAGNOSIS — R91.8 LUNG NODULE, MULTIPLE: ICD-10-CM

## 2022-12-29 DIAGNOSIS — Z72.0 TOBACCO ABUSE: ICD-10-CM

## 2022-12-29 DIAGNOSIS — J43.9 PULMONARY EMPHYSEMA, UNSPECIFIED EMPHYSEMA TYPE (HCC): ICD-10-CM

## 2022-12-29 PROCEDURE — 99204 OFFICE O/P NEW MOD 45 MIN: CPT | Performed by: INTERNAL MEDICINE

## 2022-12-29 RX ORDER — ALBUTEROL SULFATE 90 UG/1
2 AEROSOL, METERED RESPIRATORY (INHALATION) EVERY 6 HOURS PRN
Qty: 1 EACH | Refills: 11 | Status: SHIPPED | OUTPATIENT
Start: 2022-12-29

## 2022-12-29 NOTE — PATIENT INSTRUCTIONS
Please obtain a CT chest scan and breathing test (pulmonary function test) in mid March 2023. Start albuterol 2 puffs every 6 hours as needed for your breathing. You can use this preemptively such as before exercise or bike ride or other activities. If you feel your breathing is not helped with the albuterol, we can consider other inhalers such as spiriva or incruse.   Follow up with me after the tests are done - March 2023

## 2023-03-17 ENCOUNTER — HOSPITAL ENCOUNTER (OUTPATIENT)
Dept: CT IMAGING | Facility: HOSPITAL | Age: 68
Discharge: HOME | End: 2023-03-17
Attending: INTERNAL MEDICINE
Payer: MEDICARE

## 2023-03-17 DIAGNOSIS — R91.8 LUNG NODULE, MULTIPLE: ICD-10-CM

## 2023-03-17 PROCEDURE — 71250 CT THORAX DX C-: CPT | Performed by: INTERNAL MEDICINE

## 2023-03-21 ENCOUNTER — OFFICE VISIT (OUTPATIENT)
Facility: CLINIC | Age: 68
End: 2023-03-21
Payer: MEDICARE

## 2023-03-21 VITALS
OXYGEN SATURATION: 98 % | DIASTOLIC BLOOD PRESSURE: 58 MMHG | HEART RATE: 73 BPM | RESPIRATION RATE: 16 BRPM | SYSTOLIC BLOOD PRESSURE: 100 MMHG

## 2023-03-21 DIAGNOSIS — J92.9 PLEURAL PLAQUE WITHOUT ASBESTOS: ICD-10-CM

## 2023-03-21 DIAGNOSIS — R06.09 DOE (DYSPNEA ON EXERTION): ICD-10-CM

## 2023-03-21 DIAGNOSIS — Z72.0 TOBACCO ABUSE: ICD-10-CM

## 2023-03-21 DIAGNOSIS — R91.8 LUNG NODULES: Primary | ICD-10-CM

## 2023-03-21 DIAGNOSIS — J44.9 CHRONIC OBSTRUCTIVE PULMONARY DISEASE, UNSPECIFIED COPD TYPE (HCC): ICD-10-CM

## 2023-03-21 PROCEDURE — 99213 OFFICE O/P EST LOW 20 MIN: CPT | Performed by: INTERNAL MEDICINE

## 2023-03-21 NOTE — PATIENT INSTRUCTIONS
Plan on a repeat CT chest in September/October 2023 - if you get sick (cold, sinus infection or other infection), then postpone the scan. Call with questions/concerns  Use albuterol inhaler 2 puffs every 6 hours as needed for your breathing. Follow up in September/October 2023 after the CT scan.    Call with questions/concerns

## 2023-07-11 ENCOUNTER — HOSPITAL ENCOUNTER (OUTPATIENT)
Dept: MAMMOGRAPHY | Facility: HOSPITAL | Age: 68
Discharge: HOME | End: 2023-07-11
Attending: SURGERY
Payer: MEDICARE

## 2023-07-11 DIAGNOSIS — R92.8 ABNORMAL MAMMOGRAM OF LEFT BREAST: ICD-10-CM

## 2023-07-11 PROCEDURE — 77062 BREAST TOMOSYNTHESIS BI: CPT | Performed by: SURGERY

## 2023-07-11 PROCEDURE — 76642 ULTRASOUND BREAST LIMITED: CPT | Performed by: SURGERY

## 2023-07-11 PROCEDURE — 77066 DX MAMMO INCL CAD BI: CPT | Performed by: SURGERY

## 2023-08-28 ENCOUNTER — OFFICE VISIT (OUTPATIENT)
Dept: SURGERY | Facility: CLINIC | Age: 68
End: 2023-08-28
Payer: MEDICARE

## 2023-08-28 VITALS
RESPIRATION RATE: 16 BRPM | HEART RATE: 89 BPM | SYSTOLIC BLOOD PRESSURE: 126 MMHG | BODY MASS INDEX: 30.4 KG/M2 | DIASTOLIC BLOOD PRESSURE: 86 MMHG | TEMPERATURE: 97 F | WEIGHT: 165.19 LBS | HEIGHT: 62 IN | OXYGEN SATURATION: 97 %

## 2023-08-28 DIAGNOSIS — N63.21 MASS OF UPPER OUTER QUADRANT OF LEFT BREAST: Primary | ICD-10-CM

## 2023-08-28 PROCEDURE — 99213 OFFICE O/P EST LOW 20 MIN: CPT | Performed by: SURGERY

## 2023-08-29 PROBLEM — N63.21 MASS OF UPPER OUTER QUADRANT OF LEFT BREAST: Status: ACTIVE | Noted: 2023-08-29

## 2023-10-13 ENCOUNTER — OFFICE VISIT (OUTPATIENT)
Dept: INTERNAL MEDICINE CLINIC | Facility: CLINIC | Age: 68
End: 2023-10-13
Payer: MEDICARE

## 2023-10-13 ENCOUNTER — LAB ENCOUNTER (OUTPATIENT)
Dept: LAB | Age: 68
End: 2023-10-13
Attending: INTERNAL MEDICINE
Payer: MEDICARE

## 2023-10-13 VITALS
SYSTOLIC BLOOD PRESSURE: 110 MMHG | HEIGHT: 62.5 IN | BODY MASS INDEX: 29.21 KG/M2 | HEART RATE: 80 BPM | DIASTOLIC BLOOD PRESSURE: 70 MMHG | RESPIRATION RATE: 12 BRPM | TEMPERATURE: 98 F | WEIGHT: 162.81 LBS

## 2023-10-13 DIAGNOSIS — R73.03 PRE-DIABETES: ICD-10-CM

## 2023-10-13 DIAGNOSIS — E04.1 THYROID NODULE: ICD-10-CM

## 2023-10-13 DIAGNOSIS — Z00.00 ENCOUNTER FOR ANNUAL HEALTH EXAMINATION: Primary | ICD-10-CM

## 2023-10-13 LAB
ALBUMIN SERPL-MCNC: 3.9 G/DL (ref 3.4–5)
ALBUMIN/GLOB SERPL: 1.1 {RATIO} (ref 1–2)
ALP LIVER SERPL-CCNC: 47 U/L
ALT SERPL-CCNC: 35 U/L
ANION GAP SERPL CALC-SCNC: 7 MMOL/L (ref 0–18)
AST SERPL-CCNC: 15 U/L (ref 15–37)
BILIRUB SERPL-MCNC: 0.4 MG/DL (ref 0.1–2)
BUN BLD-MCNC: 12 MG/DL (ref 7–18)
CALCIUM BLD-MCNC: 9.2 MG/DL (ref 8.5–10.1)
CHLORIDE SERPL-SCNC: 105 MMOL/L (ref 98–112)
CO2 SERPL-SCNC: 27 MMOL/L (ref 21–32)
CREAT BLD-MCNC: 0.78 MG/DL
EGFRCR SERPLBLD CKD-EPI 2021: 84 ML/MIN/1.73M2 (ref 60–?)
FASTING STATUS PATIENT QL REPORTED: NO
GLOBULIN PLAS-MCNC: 3.5 G/DL (ref 2.8–4.4)
GLUCOSE BLD-MCNC: 78 MG/DL (ref 70–99)
OSMOLALITY SERPL CALC.SUM OF ELEC: 287 MOSM/KG (ref 275–295)
POTASSIUM SERPL-SCNC: 3.8 MMOL/L (ref 3.5–5.1)
PROT SERPL-MCNC: 7.4 G/DL (ref 6.4–8.2)
SODIUM SERPL-SCNC: 139 MMOL/L (ref 136–145)
TSI SER-ACNC: 1.53 MIU/ML (ref 0.36–3.74)

## 2023-10-13 PROCEDURE — 83036 HEMOGLOBIN GLYCOSYLATED A1C: CPT

## 2023-10-13 PROCEDURE — 80053 COMPREHEN METABOLIC PANEL: CPT

## 2023-10-13 PROCEDURE — 36415 COLL VENOUS BLD VENIPUNCTURE: CPT

## 2023-10-13 PROCEDURE — 84443 ASSAY THYROID STIM HORMONE: CPT

## 2023-10-13 RX ORDER — KETOCONAZOLE 20 MG/G
CREAM TOPICAL
COMMUNITY
Start: 2023-05-02

## 2023-10-15 LAB — HGBA1C: 5.7 %

## 2023-10-20 ENCOUNTER — HOSPITAL ENCOUNTER (OUTPATIENT)
Dept: CT IMAGING | Facility: HOSPITAL | Age: 68
Discharge: HOME | End: 2023-10-20
Attending: INTERNAL MEDICINE

## 2023-10-20 ENCOUNTER — RT VISIT (OUTPATIENT)
Dept: RESPIRATORY THERAPY | Facility: HOSPITAL | Age: 68
End: 2023-10-20
Attending: INTERNAL MEDICINE
Payer: MEDICARE

## 2023-10-20 DIAGNOSIS — R91.8 LUNG NODULES: ICD-10-CM

## 2023-10-20 DIAGNOSIS — J44.9 CHRONIC OBSTRUCTIVE PULMONARY DISEASE, UNSPECIFIED COPD TYPE (HCC): ICD-10-CM

## 2023-10-20 PROCEDURE — 94726 PLETHYSMOGRAPHY LUNG VOLUMES: CPT

## 2023-10-20 PROCEDURE — 94729 DIFFUSING CAPACITY: CPT

## 2023-10-20 PROCEDURE — 94060 EVALUATION OF WHEEZING: CPT

## 2023-10-20 PROCEDURE — 71250 CT THORAX DX C-: CPT | Performed by: INTERNAL MEDICINE

## 2023-10-24 ENCOUNTER — OFFICE VISIT (OUTPATIENT)
Facility: CLINIC | Age: 68
End: 2023-10-24

## 2023-10-24 VITALS
SYSTOLIC BLOOD PRESSURE: 120 MMHG | HEIGHT: 62 IN | WEIGHT: 165 LBS | DIASTOLIC BLOOD PRESSURE: 62 MMHG | RESPIRATION RATE: 16 BRPM | HEART RATE: 72 BPM | BODY MASS INDEX: 30.36 KG/M2 | OXYGEN SATURATION: 97 %

## 2023-10-24 DIAGNOSIS — R91.8 LUNG NODULE, MULTIPLE: Primary | ICD-10-CM

## 2023-10-24 DIAGNOSIS — J92.9 PLEURAL PLAQUE WITHOUT ASBESTOS: ICD-10-CM

## 2023-10-24 DIAGNOSIS — F17.210 NICOTINE DEPENDENCE, CIGARETTES, UNCOMPLICATED: ICD-10-CM

## 2023-10-24 DIAGNOSIS — J44.9 CHRONIC OBSTRUCTIVE PULMONARY DISEASE, UNSPECIFIED COPD TYPE (HCC): ICD-10-CM

## 2023-10-24 DIAGNOSIS — Z72.0 TOBACCO ABUSE: ICD-10-CM

## 2023-10-24 PROCEDURE — 99214 OFFICE O/P EST MOD 30 MIN: CPT | Performed by: INTERNAL MEDICINE

## 2023-10-24 NOTE — PROCEDURES
Pulmonary Function Test:   Findings:  Spirometry: FEV1 is 1.74L, 81% predicted. FVC is 2.54L, 92% predicted and FEV1/ FVC ratio is 0.69. There is no significant bronchodilator response after administration of albuterol. The flow-volume loop demonstrates an obstructive pattern. MVV is 42, 53%predicted  Lung Volumes: The TLC is 4.76L, 103% predicted. The residual volume 2.16L, 113% predicted. Diffusion Capacity:  The diffusion capacity is 11.0 or 60% predicted and 72% predicted when corrected for alveolar volume. Impression:  There is mild airway obstruction on spirometry and visualized on flow-volume loop. There is no significant bronchodilator response, but this does not preclude treatment with bronchodilators. [place] MVV is reduced that  may indicate insufficient neuromuscular reserve, abnormal respiratory mechanics, or an inadequate effort, clinical correlation is suggested    Lung volumes show TLC  within normal limits. Despite the absence of airway obstruction, there is evidence of air trapping (residual volume of 42L,53% predicted). Despite the absence of airway obstruction, there is evidence of hyperinflation (total lung capacity of 2.16L,113% predicted). Diffusion capacity is moderately reduced  improves to normal range when considering alveolar volume. This may represent a normal finding but can be seen in emphysema, interstitial lung disease, pulmonary vascular disease (such as pulmonary hypertension), atelectasis and anemia. If not already performed, would suggest further evaluation as determined clinically. There are no previous pulmonary function tests available for comparison.      Joe Rodriguez MD

## 2023-10-24 NOTE — PATIENT INSTRUCTIONS
Obtain a CT scan in/around October 20th 2024  Continue to use albuterol 2 puffs every 6 hours as needed for your breathing or cough  Let me know if you have any respiratory issues/concerns

## 2024-04-16 ENCOUNTER — PATIENT MESSAGE (OUTPATIENT)
Dept: INTERNAL MEDICINE CLINIC | Facility: CLINIC | Age: 69
End: 2024-04-16

## 2024-04-16 NOTE — TELEPHONE ENCOUNTER
From: Carla Malhotra  To: Niyah Schaeffer  Sent: 4/16/2024 12:24 PM CDT  Subject: Vac update     January 19, 2024 - Covid (Pfizer)  October 24, 2023 - Flu  October 24, 2023 - RSV    Please add to my chart    Thanks!

## 2024-07-31 ENCOUNTER — HOSPITAL ENCOUNTER (OUTPATIENT)
Dept: MAMMOGRAPHY | Facility: HOSPITAL | Age: 69
Discharge: HOME | End: 2024-07-31
Attending: SURGERY
Payer: MEDICARE

## 2024-07-31 DIAGNOSIS — N63.21 MASS OF UPPER OUTER QUADRANT OF LEFT BREAST: ICD-10-CM

## 2024-07-31 PROCEDURE — 77062 BREAST TOMOSYNTHESIS BI: CPT | Performed by: SURGERY

## 2024-07-31 PROCEDURE — 77066 DX MAMMO INCL CAD BI: CPT | Performed by: SURGERY

## 2024-09-24 ENCOUNTER — HOSPITAL ENCOUNTER (OUTPATIENT)
Dept: CT IMAGING | Facility: HOSPITAL | Age: 69
Discharge: HOME | End: 2024-09-24
Attending: INTERNAL MEDICINE
Payer: MEDICARE

## 2024-09-24 DIAGNOSIS — Z72.0 TOBACCO ABUSE: ICD-10-CM

## 2024-09-24 DIAGNOSIS — F17.210 NICOTINE DEPENDENCE, CIGARETTES, UNCOMPLICATED: ICD-10-CM

## 2024-09-24 PROCEDURE — 71271 CT THORAX LUNG CANCER SCR C-: CPT | Performed by: INTERNAL MEDICINE

## 2024-10-13 NOTE — PROGRESS NOTES
Subjective:   Carla Malhotra is a 69 year old female who presents for a Subsequent Annual Wellness visit (Pt already had Initial Annual Wellness) and med check.     Pap done 6/2022 negative with negative hpv. Breast and pelvic done today.   Mammo done 7/2024. Done by dr. Fernández.   Colonoscopy done 7/2022, repeat in 3 years.   Dexa done 2/2020 was normal. Ordered.     Up to date tdap, shingrix, prevnar 20, rsv.   Get covid booster at her local pharmacy.     [facility] flowsheet reviewed.   No falls.   Memory testing normal.   Mood has been stable. No depression.   Seeing eye doctor yearly.      Seeing pulm for abnormal chest ct, emphysema. On albuterol prn.   Just had a ct chest which was stable.      Thyroid nodule: has not seen dr. Myrick at [facility]. She thinks she may like to see someone here.      Pre diabetes: working on diet and exercise.      Seeing dr. Fernández for abnormal mammo findings. Mammo per dr. Fernández.     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 Gets this about once a year usually when she is in the sun for too long and she was just in [place]. Uses an antiviral cream for it usually but it is old from 2017. Would like a new order.     History/Other:   Fall Risk Assessment:   She has been screened for Falls and is low risk.      Cognitive Assessment:   She had a completely normal cognitive assessment - see flowsheet entries       Functional Ability/Status:   Carla Malhotra has a completely normal functional assessment. See flowsheet for details.      Depression Screening (PHQ):  PHQ-2 SCORE: 0  , done 10/14/2024   Last [place] Suicide Screening on 10/15/2024 was No Risk.       Advanced Directives:   She does NOT have a Living Will. [Do you have a living will?: (Patient-Rptd) No]  She does NOT have a Power of  for Health Care. [Do you have a healthcare power of ?: No (packet given)]  Discussed with patient and provided information.        Patient Active  Problem List   Diagnosis    Colon polyps    Diverticulitis of colon    Depression    Thyroid nodule    Snoring    Carpal tunnel syndrome    Numbness and tingling in hands    History of adenomatous polyp of colon    Internal hemorrhoids    Diverticulosis    Mass of upper outer quadrant of left breast    Chronic obstructive pulmonary disease (HCC)     Allergies:  She is allergic to ceclor [cefaclor] and codeine.    Current Medications:  Outpatient Medications Marked as Taking for the 10/15/24 encounter (Office Visit) with Niyah Schaeffer MD   Medication Sig    valACYclovir 500 MG Oral Tab Take 1 tablet (500 mg total) by mouth every 12 (twelve) hours.    acyclovir 5 % External Ointment Apply 1 Application topically in the morning, at noon, and at bedtime.    ketoconazole 2 % External Cream APPLY TO IN BETWEEN BREAST TWICE DAILY AS NEEDED    tretinoin 0.025 % External Cream every other day.    albuterol 108 (90 Base) MCG/ACT Inhalation Aero Soln Inhale 2 puffs into the lungs every 6 (six) hours as needed for Wheezing or Shortness of Breath.    BIOTIN OR Take by mouth daily.    Calcium Carbonate-Vit D-Min (CALCIUM 1200 OR) Take by mouth daily.    TURMERIC OR Take by mouth daily.    Melatonin 10 MG Oral Tab Take by mouth nightly as needed.    Cyanocobalamin (VITAMIN B 12 OR) Take 2,000 mg by mouth daily.    omega-3 fatty acids (FISH OIL) 1000 MG Oral Cap Take 1,000 mg by mouth daily.    Ascorbic Acid (VITAMIN C) 1000 MG Oral Tab Take 1 tablet (1,000 mg total) by mouth daily.    Multiple Vitamin (MULTIVITAMIN OR) Take  by mouth daily.       Medical History:  She  has a past medical history of ALCOHOL USE, Allergic rhinitis (2000), Body piercing (Ears), Carpal tunnel syndrome, Colon polyps (05/05/2009), Depression, Diverticulitis of colon (05/05/2009), Hearing loss (2015), History of bone density study (04/2008), Night sweats, Numbness and tingling in hands, Positive tuberculin, Sleep disturbance (2007), Snoring, Spontaneous  pneumothorax (at 32 yrs old), Thyroid nodule, and Wears glasses (Glasses contacts 1970).  Surgical History:  She  has a past surgical history that includes tubal ligation; colonoscopy (2009); fna (indicate source below); colonoscopy (10/24/2014); colonoscopy; tonsillectomy (1970); cornelia biopsy stereo nodule 1 site left (cpt=19081); cornelia biopsy stereo nodule 1 site left (cpt=19081) (10/27/2021); thoracoscopy surg w pleurodesis (Left);  (, ); and other surgical history ().   Family History:  Her family history includes Cancer in her father; Heart Attack in her mother; alcoholic in her father; esophageal ca in her father; etoh abuse in an other family member.  Social History:  She  reports that she has quit smoking. Her smoking use included cigarettes. She has never used smokeless tobacco. She reports current alcohol use of about 6.0 standard drinks of alcohol per week. She reports that she does not use drugs.    Tobacco:  She smoked tobacco in the past but quit greater than 12 months ago.  Social History     Tobacco Use   Smoking Status Former    Current packs/day: 0.00    Types: Cigarettes   Smokeless Tobacco Never   Tobacco Comments    5-7 cigarettes, quit in         CAGE Alcohol Screen:   CAGE screening score of 0 on 10/14/2024, showing low risk of alcohol abuse.      Patient Care Team:  Niyah Schaeffer MD as PCP - General  [place], MD Yovani as Consulting Physician (SURGERY, GENERAL)  William Matute MD as Consulting Physician (GASTROENTEROLOGY)  Shane Edward as Consulting Physician (OTOLARYNGOLOGY)  Reyna Schreiber MD (DERMATOLOGY)    Review of Systems  See hpi    Objective:   Physical Exam  General appearance: alert, appears stated age, and cooperative  Lungs: clear to auscultation bilaterally  Breasts:  symmetric, no masses, no tenderness, no nipple discharge  Heart: S1, S2 normal, no murmur, click, rub or gallop, regular rate and rhythm  Abdomen: soft, non-tender; bowel sounds normal; no  masses,  no organomegaly  GENITAL/URINARY:  External Genitalia:  General appearance; normal, Hair distribution; normal, Lesions absent, Urethral Meatus:  Size normal, Location normal, Lesions absent, Prolapse absent, Vagina:  General appearance normal, Lesions absent, Pelvic support normal, Cervix:  General appearance normal, Discharge absent, Tenderness absent, Enlargement absent, Uterus:  Masses absent, Adnexa:  Masses absent, Tenderness absent, Anus/Perineum:  Lesions absent and Masses absent  No pap today.    Extremities:  no edema, muscle strength normal.   Skin: crusty vesicular rash on her chin, mostly crusted over now.    /74 (BP Location: Right arm, Patient Position: Sitting, Cuff Size: adult)   Pulse 80   Temp 97.5 °F (36.4 °C) (Temporal)   Resp 16   Ht 5' 2.25\" (1.581 m)   Wt 164 lb (74.4 kg)   Breastfeeding No   BMI 29.76 kg/m²  Estimated body mass index is 29.76 kg/m² as calculated from the following:    Height as of this encounter: 5' 2.25\" (1.581 m).    Weight as of this encounter: 164 lb (74.4 kg).    Medicare Hearing Assessment:   Hearing Screening    Screening Method: Finger Rub  Finger Rub Result: Pass (Comment: has hearing aids)               Assessment & Plan:   Carla Malhotra is a 69 year old female who presents for a Medicare Assessment.     1. Encounter for annual health examination  Pap done 6/2022 negative with negative hpv. Breast and pelvic done today.   Mammo done 7/2024. Done by dr. Fernández.   Colonoscopy done 7/2022, repeat in 3 years.   Dexa done 2/2020 was normal. Ordered.     Up to date tdap, shingrix, prevnar 20, rsv.   Get covid booster at her local pharmacy.     [facility] flowsheet reviewed.   No falls.   Memory testing normal.   Mood has been stable. No depression.   Seeing eye doctor yearly.      2. Pulmonary emphysema, unspecified emphysema type (HCC)  Continue albuterol prn   follow up with pulm.     3. Thyroid nodule  Urged to see ent.   - ENT - INTERNAL    4.  Pre-diabetes  Continue low carb diet. Regular exercise.   - Hemoglobin A1C; Future  - Comp Metabolic Panel (14) [E]; Future    5. Cold sore  Can try oral valtrex next time she has this.   Rx also done for acyclovir cream.   - valACYclovir 500 MG Oral Tab; Take 1 tablet (500 mg total) by mouth every 12 (twelve) hours.  Dispense: 6 tablet; Refill: 1  - acyclovir 5 % External Ointment; Apply 1 Application topically in the morning, at noon, and at bedtime.  Dispense: 15 g; Refill: 0    6. Postmenopausal  - XR DEXA BONE DENSITOMETRY (CPT=77080); Future    7. Screening breast exam  Normal exam today.     8. Gyne exam  Pelvic done. Normal.       The patient indicates understanding of these issues and agrees to the plan.  Reinforced healthy diet, lifestyle, and exercise.      Return in about 1 year (around 10/15/2025) for annual wellness visit, med check.     Niyah Schaeffer MD, 10/13/2024     Supplementary Documentation:   General Health:  In the past six months, have you lost more than 10 pounds without trying?: (Patient-Rptd) 2 - No  Has your appetite been poor?: (Patient-Rptd) No  Type of Diet: (Patient-Rptd) Balanced  How does the patient maintain a good energy level?: (Patient-Rptd) Daily Walks  How would you describe your daily physical activity?: (Patient-Rptd) Light  How would you describe your current health state?: (Patient-Rptd) Good  How do you maintain positive mental well-being?: (Patient-Rptd) Social Interaction  On a scale of 0 to 10, with 0 being no pain and 10 being severe pain, what is your pain level?: (Patient-Rptd) 0 - (None)  In the past six months, have you experienced urine leakage?: (Patient-Rptd) 0-No  At any time do you feel concerned for the safety/well-being of yourself and/or your children, in your home or elsewhere?: (Patient-Rptd) No  Have you had any immunizations at another office such as Influenza, Hepatitis B, Tetanus, or Pneumococcal?: (Patient-Rptd) No    Health Maintenance   Topic Date  Due    DEXA Scan  04/07/2022    Annual Depression Screening  01/01/2024    Fall Risk Screening (Annual)  01/01/2024    COVID-19 Vaccine (8 - 2023-24 season) 09/01/2024    Influenza Vaccine (1) 10/01/2024    Annual Physical  10/13/2024    Colorectal Cancer Screening  07/27/2025    Mammogram  07/31/2025    Pap Smear  06/27/2027    Pneumococcal Vaccine: 65+ Years  Completed    Zoster Vaccines  Completed

## 2024-10-15 ENCOUNTER — TELEPHONE (OUTPATIENT)
Dept: INTERNAL MEDICINE CLINIC | Facility: CLINIC | Age: 69
End: 2024-10-15

## 2024-10-15 ENCOUNTER — OFFICE VISIT (OUTPATIENT)
Dept: INTERNAL MEDICINE CLINIC | Facility: CLINIC | Age: 69
End: 2024-10-15
Payer: MEDICARE

## 2024-10-15 VITALS
WEIGHT: 164 LBS | BODY MASS INDEX: 29.8 KG/M2 | SYSTOLIC BLOOD PRESSURE: 110 MMHG | HEART RATE: 80 BPM | HEIGHT: 62.25 IN | DIASTOLIC BLOOD PRESSURE: 74 MMHG | TEMPERATURE: 98 F | RESPIRATION RATE: 16 BRPM

## 2024-10-15 DIAGNOSIS — Z78.0 POSTMENOPAUSAL: ICD-10-CM

## 2024-10-15 DIAGNOSIS — B00.1 COLD SORE: ICD-10-CM

## 2024-10-15 DIAGNOSIS — Z00.00 ENCOUNTER FOR ANNUAL HEALTH EXAMINATION: Primary | ICD-10-CM

## 2024-10-15 DIAGNOSIS — Z01.419 ENCOUNTER FOR GYNECOLOGICAL EXAMINATION (GENERAL) (ROUTINE) WITHOUT ABNORMAL FINDINGS: ICD-10-CM

## 2024-10-15 DIAGNOSIS — R73.03 PRE-DIABETES: ICD-10-CM

## 2024-10-15 DIAGNOSIS — J43.9 PULMONARY EMPHYSEMA, UNSPECIFIED EMPHYSEMA TYPE (HCC): ICD-10-CM

## 2024-10-15 DIAGNOSIS — E04.1 THYROID NODULE: ICD-10-CM

## 2024-10-15 DIAGNOSIS — Z12.39 SCREENING BREAST EXAMINATION: ICD-10-CM

## 2024-10-15 PROCEDURE — 99214 OFFICE O/P EST MOD 30 MIN: CPT | Performed by: INTERNAL MEDICINE

## 2024-10-15 PROCEDURE — G0439 PPPS, SUBSEQ VISIT: HCPCS | Performed by: INTERNAL MEDICINE

## 2024-10-15 RX ORDER — ACYCLOVIR 50 MG/G
1 OINTMENT TOPICAL 3 TIMES DAILY
Qty: 15 G | Refills: 0 | Status: SHIPPED | OUTPATIENT
Start: 2024-10-15

## 2024-10-15 RX ORDER — VALACYCLOVIR HYDROCHLORIDE 500 MG/1
500 TABLET, FILM COATED ORAL EVERY 12 HOURS SCHEDULED
Qty: 6 TABLET | Refills: 1 | Status: SHIPPED | OUTPATIENT
Start: 2024-10-15

## 2024-10-15 NOTE — TELEPHONE ENCOUNTER
Incoming (mail or fax):  fax  Received from:  [facility]  Documentation given to:  triage in    Prior auth for acyclovir

## 2024-10-16 NOTE — TELEPHONE ENCOUNTER
Called pharmacy and they said we could not change the form of the Rx . PA initiated and all questions answered.

## 2024-10-17 NOTE — TELEPHONE ENCOUNTER
Rec approval for acyclovir 5% ointment. See below. Sent for scanning. Rec from [facility] ([facility]). [facility] sent notifying patient.

## 2024-10-17 NOTE — TELEPHONE ENCOUNTER
Incoming (mail or fax):  Fax  Received from:  [employer]  Documentation given to:  Triage incoming    Approval for acyclovir ointment

## 2024-10-22 ENCOUNTER — OFFICE VISIT (OUTPATIENT)
Dept: SURGERY | Facility: CLINIC | Age: 69
End: 2024-10-22
Payer: MEDICARE

## 2024-10-22 VITALS
BODY MASS INDEX: 29.81 KG/M2 | HEIGHT: 62 IN | HEART RATE: 82 BPM | WEIGHT: 162 LBS | RESPIRATION RATE: 18 BRPM | DIASTOLIC BLOOD PRESSURE: 78 MMHG | SYSTOLIC BLOOD PRESSURE: 117 MMHG | OXYGEN SATURATION: 97 % | TEMPERATURE: 97 F

## 2024-10-22 DIAGNOSIS — Z98.890 HISTORY OF LEFT BREAST BIOPSY: Primary | ICD-10-CM

## 2024-10-22 DIAGNOSIS — N63.21 MASS OF UPPER OUTER QUADRANT OF LEFT BREAST: ICD-10-CM

## 2024-10-22 PROCEDURE — 99213 OFFICE O/P EST LOW 20 MIN: CPT | Performed by: SURGERY

## 2024-10-23 PROBLEM — Z98.890 HISTORY OF LEFT BREAST BIOPSY: Status: ACTIVE | Noted: 2024-10-23

## 2024-10-23 NOTE — PROGRESS NOTES
Breast Surgery Surveillance Visit    History of Present Illness:   Ms. Carla Malhotra is a 69 year old woman who presents with concerns related to multiple callbacks and abnormal mammograms over the years.  She denies any palpable masses, nipple discharge, skin changes or axillary symptoms.  She does have a remote history of a benign left breast biopsy in 2007.  She does not have any known family history of breast cancer.  She states she has been called back for many years related to findings in her breast.  Her screening mammogram on October 19, 2022 showed heterogeneously dense breast tissue with an asymmetry in the outer left breast for which additional imaging was recommended.  Additional imaging on November 2, 2022 demonstrated persistent focal asymmetry with no finding to correlate for this on ultrasound.  She was recommended for an MRI to further evaluate this area at which time she was noted to have what appeared to be posttraumatic changes in the breast with no concerning findings outside of a likely intramammary lymph node in the upper outer left breast for which surveillance was recommended.  Incidentally, she was noted to have a lesion in the left upper lobe of the lung for which a dedicated CT of the chest was recommended.  This was performed in December 2022 and demonstrated evidence of prior pleurodesis surgery with possible concerning pleural-based nodules.  She denies any new clinical changes since her last visit.  She had a bilateral diagnostic evaluation on July 31, 2024 which confirmed heterogeneously dense breast tissue with stable findings including 2 left-sided biopsy clips.  She is here today for evaluation and recommendations for further therapy.        Past Medical History:    ALCOHOL USE    Allergic rhinitis    Treat with otc meds    Body piercing    Carpal tunnel syndrome    Colon polyps    Depression    Diverticulitis of colon    left    Hearing loss    History of bone density study     Night sweats    menopausal    Numbness and tingling in hands    upon waking up    Positive tuberculin    Sleep disturbance    Night sweats cause me to wake frequently    Snoring    Spontaneous pneumothorax    Thyroid nodule    right, s/p fna    Wears glasses       Past Surgical History:   Procedure Laterality Date    Colonoscopy  2009    Colonoscopy  10/24/2014    colon polyps, diverticulosis    Colonoscopy      Fna (indicate source below)      thyroid nodule    Kip biopsy stereo nodule 1 site left (cpt=19081)      aprox 10 yrs ago    Kip biopsy stereo nodule 1 site left (cpt=19081)  10/27/2021    fibrocystic changes      ,     Other surgical history      Spontaneous Pneumothorax Repair    Thoracoscopy surg w pleurodesis Left     at age 32 sec to spontaneous pneumothorax, not able to reexpand with chest tube.    Tonsillectomy      Tubal ligation         Gynecological History:  Pt is a   Pt was 27 years old at time of first pregnancy.    She has cumulative breastfeeding history of 12 months.  She achieved menarche at age 16 and LMP age 52  Age of Menopause: 52  Type: natural menopause  She denies any history of hormone replacement therapy  She has history of oral contraceptive use for 23 years, last in .  She has recieved infertility treatment to achieve pregnancy.    Medications:     valACYclovir 500 MG Oral Tab Take 1 tablet (500 mg total) by mouth every 12 (twelve) hours. 6 tablet 1    acyclovir 5 % External Ointment Apply 1 Application topically in the morning, at noon, and at bedtime. 15 g 0    ketoconazole 2 % External Cream APPLY TO IN BETWEEN BREAST TWICE DAILY AS NEEDED      tretinoin 0.025 % External Cream every other day.      albuterol 108 (90 Base) MCG/ACT Inhalation Aero Soln Inhale 2 puffs into the lungs every 6 (six) hours as needed for Wheezing or Shortness of Breath. 1 each 11    BIOTIN OR Take by mouth daily.      Calcium Carbonate-Vit D-Min (CALCIUM 1200 OR) Take  by mouth daily.      TURMERIC OR Take by mouth daily.      Melatonin 10 MG Oral Tab Take by mouth nightly as needed.      Cyanocobalamin (VITAMIN B 12 OR) Take 2,000 mg by mouth daily.      omega-3 fatty acids (FISH OIL) 1000 MG Oral Cap Take 1,000 mg by mouth daily.      Ascorbic Acid (VITAMIN C) 1000 MG Oral Tab Take 1 tablet (1,000 mg total) by mouth daily.      Multiple Vitamin (MULTIVITAMIN OR) Take  by mouth daily.         Allergies:    Allergies   Allergen Reactions    Ceclor [Cefaclor] HIVES    Codeine        Family History:   Family History   Problem Relation Age of Onset    Other (esophageal ca[Other]) Father     Other (alcoholic[Other]) Father     Cancer Father     Heart Attack Mother         age 70    Other (etoh abuse[Other]) Other        She is of Ashkenazi [place] ancestry.    Social History:  History   Alcohol Use    6.0 standard drinks of alcohol/week    4 Glasses of wine, 2 Standard drinks or equivalent per week     Comment: 3 drinks a week         History   Smoking Status    Former    Types: Cigarettes   Smokeless Tobacco    Never       Ms. Carla Malhotra is  with 2 children. She has 3 siblings. She is currently Retired      Review of Systems:  General:   The patient denies, fever, chills, +night sweats, fatigue, generalized weakness, change in appetite or weight loss.    HEENT:     The patient denies eye irritation, cataracts, redness, glaucoma, yellowing of the eyes, change in vision, color blindness, or +wearing contacts/glasses. The patient denies +hearing loss, ringing in the ears, ear drainage, earaches, nasal congestion, nose bleeds, +snoring, pain in mouth/throat, hoarseness, change in voice, facial trauma.    Respiratory:  The patient denies chronic cough, phlegm, hemoptysis, pleurisy/chest pain, pneumonia, asthma, wheezing, difficulty in breathing with exertion, emphysema, chronic bronchitis, shortness of breath or abnormal sound when breathing.     Cardiovascular:  There is no  history of chest pain, chest pressure/discomfort, palpitations, irregular heartbeat, fainting or near-fainting, difficulty breathing when lying flat, SOB/Coughing at night, swelling of the legs or chest pain while walking.    Breasts:  See history of present illness    Gastrointestinal:     There is no history of difficulty or pain with swallowing, reflux symptoms, vomiting, dark or bloody stools, constipation, yellowing of the skin, indigestion, nausea, change in bowel habits, diarrhea, abdominal pain or vomiting blood.     Genitourinary:  The patient denies frequent urination, needing to get up at night to urinate, urinary hesitancy or retaining urine, painful urination, urinary incontinence, decreased urine stream, blood in the urine or vaginal/penile discharge.    Skin:    The patient denies rash, itching, skin lesions, dry skin, change in skin color or change in moles.     Hematologic/Lymphatic:  The patient denies easily bruising or bleeding or persistent swollen glands or lymph nodes.     Musculoskeletal:  The patient denies muscle aches/pain, joint pain, stiff joints, neck pain, back pain or bone pain.    Neuropsychiatric:  There is no history of migraines or severe headaches, seizure/epilepsy, speech problems, coordination problems, trembling/tremors, fainting/black outs, dizziness, memory problems, loss of sensation/numbness, problems walking, weakness, tingling or burning in hands/feet. There is no history of abusive relationship, bipolar disorder, sleep disturbance, anxiety, depression or feeling of despair.    Endocrine:    There is no history of poor/slow wound healing, weight loss/gain, fertility or hormone problems, cold intolerance, thyroid disease.     Allergic/Immunologic:  There is no history of hives, hay fever, angioedema or anaphylaxis.    /78 (BP Location: Left arm, Patient Position: Sitting, Cuff Size: adult)   Pulse 82   Temp 97.4 °F (36.3 °C) (Temporal)   Resp 18   Ht 1.575 m (5'  2\")   Wt 73.5 kg (162 lb)   SpO2 97%   BMI 29.63 kg/m²     Physical Exam:  The patient is an alert, oriented, well-nourished and  well-developed woman who appears her stated age. Her speech patterns and movements are normal. Her affect is appropriate.    HEENT: The head is normocephalic. The neck is supple. The thyroid is not enlarged and is without palpable masses/nodules. There are no palpable masses. The trachea is in the midline. Conjunctiva are clear, non-icteric.    Chest: The chest expands symmetrically. The lungs are clear to auscultation.    Heart: The rhythm is regular.  There are no murmurs, rubs, gallops or thrills.    Breasts:  Her breasts are symmetrical with a cup size 36C.  Right breast: The skin, nipple ,and areola appear normal. There is no skin dimpling with movement of the pectoralis. There is no nipple retraction. No nipple discharge can be elicited. The parenchyma is mildly nodular. There are no dominant masses in the breast. The axillary tail is normal.  Left breast:   The skin, nipple, and areola appear normal. There is no skin dimpling with movement of the pectoralis. There is no nipple retraction. No nipple discharge can be elicited. The parenchyma is mildly nodular. There are no dominant masses in the breast. The axillary tail is normal.    Abdomen:  The abdomen is soft, flat and non tender. The liver is not enlarged. There are no palpable masses.    Lymph Nodes:  The supraclavicular, axillary and cervical regions are free of significant lymphadenopathy.    Back: There is no vertebral column tenderness.    Skin: The skin appears normal. There are no suspicious appearing rashes or lesions.    Extremities: The extremities are without deformity, cyanosis or edema.    Impression:   Ms. Carla Malhotra is a 69 year old woman presents with dense breasts and history of benign left breast biopsy.    Discussion and Plan:  I had a discussion with the Patient regarding her breast exam. On exam  today I found no clinical evidence of malignancy bilaterally.  I personally reviewed the recent imaging we discussed this at length.    Patient has no clinical or radiological concerns within the breast.  Given the findings on her prior imaging including a probable benign appearing left breast mass she continues to have anxiety related to this location and therefore we will plan for bilateral diagnostic evaluation in July 2025 with annual clinical breast exam.  She will see me afterwards to discuss her ongoing plan at that point.  She was given ample opportunity for questions and those questions were answered to her satisfaction. She has been  encouraged to contact the office with any questions or concerns prior to her next appointment.     This encounter lasted a total of 25 min, more than 50% of which was dedicated to the discussion of management options.

## 2024-11-07 ENCOUNTER — OFFICE VISIT (OUTPATIENT)
Age: 69
End: 2024-11-07
Payer: MEDICARE

## 2024-11-07 VITALS
DIASTOLIC BLOOD PRESSURE: 76 MMHG | RESPIRATION RATE: 16 BRPM | SYSTOLIC BLOOD PRESSURE: 138 MMHG | BODY MASS INDEX: 29.81 KG/M2 | OXYGEN SATURATION: 95 % | HEIGHT: 62 IN | WEIGHT: 162 LBS | HEART RATE: 95 BPM

## 2024-11-07 DIAGNOSIS — R91.8 LUNG NODULE, MULTIPLE: Primary | ICD-10-CM

## 2024-11-07 DIAGNOSIS — J92.9 PLEURAL PLAQUE WITHOUT ASBESTOS: ICD-10-CM

## 2024-11-07 DIAGNOSIS — R06.09 DOE (DYSPNEA ON EXERTION): ICD-10-CM

## 2024-11-07 DIAGNOSIS — Z72.0 TOBACCO ABUSE: ICD-10-CM

## 2024-11-07 DIAGNOSIS — J44.9 CHRONIC OBSTRUCTIVE PULMONARY DISEASE, UNSPECIFIED COPD TYPE (HCC): ICD-10-CM

## 2024-11-07 DIAGNOSIS — J43.9 PULMONARY EMPHYSEMA, UNSPECIFIED EMPHYSEMA TYPE (HCC): ICD-10-CM

## 2024-11-07 PROCEDURE — 99214 OFFICE O/P EST MOD 30 MIN: CPT | Performed by: INTERNAL MEDICINE

## 2024-11-07 RX ORDER — ALBUTEROL SULFATE 90 UG/1
2 INHALANT RESPIRATORY (INHALATION) EVERY 6 HOURS PRN
Qty: 1 EACH | Refills: 11 | Status: SHIPPED | OUTPATIENT
Start: 2024-11-07

## 2024-11-07 NOTE — PATIENT INSTRUCTIONS
Continue to use albuterol 2 puffs every 6 hours as needed for your breathing or cough  You have the option to obtain a CT scan next year - let me know if you would like to obtain a scan.   Let me know if you have any respiratory issues/concerns

## 2024-11-07 NOTE — PROGRESS NOTES
[facility] General Pulmonary Progress Note    History of Present Illness:  Carla Malhotra is a 69 year old female former smoker (quit 2009, 20 pack years) with significant PMH of spontaneous pneumothorax in 1987 who presents today for follow up of abnormal chest imaging. Since last visit she reports doing well. No cough, dyspnea or pain. Has albuterol but rarely uses.     Oct 2023 previously  She denies new concerns today. No cough or dyspnea today. But was in [facility] in Sept and had JORDAN.  Did not take albuterol with her.      March 2023 previously  She denies new concerns today, no cough, dyspnea. She was in [place] recently but forgot her inhaler. Was not very active due to cold and did not notice change in breathing. Has not used albuterol yet. Plans to assess her breathing when golfing and walking in Summertime. Plans to go to [facility] ~September    Previously  She had a spontaneous pneumothorax leading her hospitalization at [facility] in 1987 and managed initially with chest tube placement but she notes the lung continued to collapse and was managed with thoracic surgery with what sounds to be pleurodesis, but not sure if she was administered talc.   She has been undergoing evaluation for a breast nodule and a CT chest was done to further evaluate. She does admit to mild dyspnea mainly with exercise or when biking up a hill. Otherwise she feels well. No cough, pain. No fevers. 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 Not aware of any exposures to chemicals, fumes, asbestos    Past Medical History:   Past Medical History:    ALCOHOL USE    Allergic rhinitis    Treat with otc meds    Body piercing    Carpal tunnel syndrome    Colon polyps    Depression    Diverticulitis of colon    left    Hearing loss    History of bone density study    Night sweats    menopausal    Numbness and tingling in hands    upon waking up    Positive tuberculin    Sleep disturbance    Night sweats cause me to wake frequently     Snoring    Spontaneous pneumothorax    Thyroid nodule    right, s/p fna    Wears glasses        Past Surgical History:   Past Surgical History:   Procedure Laterality Date    Colonoscopy  2009    Colonoscopy  10/24/2014    colon polyps, diverticulosis    Colonoscopy      Fna (indicate source below)      thyroid nodule    Kip biopsy stereo nodule 1 site left (cpt=19081)      aprox 10 yrs ago    Kip biopsy stereo nodule 1 site left (cpt=19081)  10/27/2021    fibrocystic changes      ,     Other surgical history      Spontaneous Pneumothorax Repair    Thoracoscopy surg w pleurodesis Left     at age 32 sec to spontaneous pneumothorax, not able to reexpand with chest tube.    Tonsillectomy      Tubal ligation         Family Medical History:   Family History   Problem Relation Age of Onset    Other (esophageal ca[Other]) Father     Other (alcoholic[Other]) Father     Cancer Father     Heart Attack Mother         age 70    Other (etoh abuse[Other]) Other         Social History:   Social History     Socioeconomic History    Marital status:      Spouse name: Not on file    Number of children: Not on file    Years of education: Not on file    Highest education level: Not on file   Occupational History    Occupation: RN   Tobacco Use    Smoking status: Never    Smokeless tobacco: Never    Tobacco comments:     5-7 cigarettes, quit in    Vaping Use    Vaping status: Never Used   Substance and Sexual Activity    Alcohol use: Yes     Alcohol/week: 6.0 standard drinks of alcohol     Types: 4 Glasses of wine, 2 Standard drinks or equivalent per week     Comment: 3 drinks a week    Drug use: No    Sexual activity: Not on file   Other Topics Concern    Caffeine Concern Not Asked    Exercise Not Asked    Seat Belt Not Asked    Special Diet Not Asked    Stress Concern Not Asked    Weight Concern Not Asked   Social History Narrative    Not on file     Social Drivers of Health     Financial Resource  Strain: Not on file   Food Insecurity: Not on file   Transportation Needs: Not on file   Physical Activity: Not on file   Stress: Not on file   Social Connections: Unknown (3/14/2021)    [facility]    Social Connections     Conversations with friends/family/neighbors per week: Not on file   Housing Stability: Low Risk  (7/7/2021)    [facility], [facility]    Housing Stability     Mortgage Payment Concerns?: Not on file     Number of Places Lived in the Last Year: Not on file     Unstable Housing?: Not on file        Medications:   Current Outpatient Medications   Medication Sig Dispense Refill    albuterol 108 (90 Base) MCG/ACT Inhalation Aero Soln Inhale 2 puffs into the lungs every 6 (six) hours as needed for Wheezing or Shortness of Breath (cough). 1 each 11    valACYclovir 500 MG Oral Tab Take 1 tablet (500 mg total) by mouth every 12 (twelve) hours. 6 tablet 1    acyclovir 5 % External Ointment Apply 1 Application topically in the morning, at noon, and at bedtime. 15 g 0    ketoconazole 2 % External Cream APPLY TO IN BETWEEN BREAST TWICE DAILY AS NEEDED      tretinoin 0.025 % External Cream every other day.      BIOTIN OR Take by mouth daily.      Calcium Carbonate-Vit D-Min (CALCIUM 1200 OR) Take by mouth daily.      TURMERIC OR Take by mouth daily.      Melatonin 10 MG Oral Tab Take by mouth nightly as needed.      Cyanocobalamin (VITAMIN B 12 OR) Take 2,000 mg by mouth daily.      omega-3 fatty acids (FISH OIL) 1000 MG Oral Cap Take 1,000 mg by mouth daily.      Ascorbic Acid (VITAMIN C) 1000 MG Oral Tab Take 1 tablet (1,000 mg total) by mouth daily.      Multiple Vitamin (MULTIVITAMIN OR) Take  by mouth daily.         Review of Systems: Review of Systems   Constitutional: Negative.    HENT: Negative.     Respiratory: Negative.     All other systems reviewed and are negative.       Physical  Exam:  /76 (BP Location: Right arm, Patient Position: Sitting, Cuff Size: adult)   Pulse 95   Resp 16   Ht 5' 2\" (1.575 m)   Wt 162 lb (73.5 kg)   SpO2 95%   BMI 29.63 kg/m²      Constitutional: alert, cooperative. No acute distress.  HEENT: Head NC/AT.    Cardio: Regular rate and rhythm. Normal S1 and S2. No murmurs.   Respiratory: Thorax symmetrical with no labored breathing. Clear to ausculation bilaterally with symmetrical breath sounds. No wheezing, rhonchi, rales, or crackles.   GI: NABS. Abd soft, non-tender.  Extremities: No clubbing or cyanosis. No LE edema.    Neurologic: A&Ox3. No gross motor deficits.  Skin: Warm, dry  Psych: Calm, cooperative. Pleasant affect.    Results:  Personally reviewed  WBC: 9.7, done on 6/4/2021.  HGB: 14.8, done on 6/4/2021.  PLT: 296, done on 6/4/2021.     GFR(AA): 95, done on 7/13/2022.  GFR (non-AA): 83, done on 7/13/2022.  CA: 9.2, done on 10/13/2023.  Cl: 105, done on 10/13/2023.  CO2: 27, done on 10/13/2023.     CT LUNG LD SCREENING(CPT=71271)    Result Date: 9/24/2024  CONCLUSION:  1. Lung-RADS Category  2:  Negative.  No evidence of primary lung cancer.  2. Lung-RADS Category S:    Negative.  3. Other incidental findings:  Multiple stable pleural based abnormalities with some small areas of fluid attenuation and calcification are most likely the result of a prior chemical or talc pleurodesis.  Correlation with any known clinical history is necessary.  The imaging appearance is stable.  RECOMMENDATIONS:  LUNG-RADS 2: Continued routine annual LDCT lung screening.  Suggest next exam on or around September 24, 2025.    CT Lung Screening Reporting and Data System (Lung-RADS 1.1)    Lung Cancer Specific Category   ACR -Guidelines Based Follow-up   Category    Assessment                  Recommendation   0  Incomplete  Further imaging recommended    1  Negative  Routine annual LDCT screen (age <80)   2  Benign appearance or behavior  Routine annual LDCT screen (age  <80)   3  Probably benign  Interval short-term diagnostic LDCT (6 months)   4a  Suspicious  Short-term follow-up LDCT or PET/CT (3 months)   4b  Suspicious  Multidisciplinary Conference Review   4x  Suspicious  Category 3 or 4 nodules with other suspicious features   S  Other potentially significant findings  Follow-up based on abnormality  LungRAD scores of 3, 4A, and 4B will be reviewed in the Multidisciplinary Thoracic Oncology Clinic      LOCATION:  [place]    Dictated by (CST): Landon Hopson MD on 9/24/2024 at 1:55 PM     Finalized by (CST): Landon Hopson MD on 9/24/2024 at 2:00 PM             Assessment/Plan:  #1. Pleural nodules and plaques  Noted incidentally on CT chest on workup for breast nodule  12/2022 CT chest with left sided pleural based nodules with largest nodule measuring 28x24, thickened wall with internal fluid component. Also with linear scarring areas in left upper lobe mainly. Small loculated pleural effusion noted. Some lower areas of calcified pleural plaque  3/2023 CT chest - no change  10/2023 CT chest with no change  9/2024 CT chest with no change  We reviewed her imaging in detail including differential diagnoses. I suspect the findings are residual findings post thoracic surgery and pleurodesis in 1987. The repeat CT imaging is unchanged.   She has completed monitoring up to 21 months of monitoring with no change. I suspect the findings are all simply scarring. She could consider a scan in one year to complete two year surveillance but acknowledge she is nearly at that point now. She will think about it and let me know if she wants to obtain a scan in one year or not.     #2. emphysema  Noted emphysema on chest imaging  Former smoker, quit 2009, 20 pack years  10/2023 PFTs with no obstruction, no restriction, mildly reduced DLCO  She has had mild JORDAN. Use albuterol PRN and assess change in sx. If sx persist, then add LAMA agent    #3. Dyspnea on exertion  Suspect related to  COPD/emphysema  As above    #4.Tobacco abuse  Former smoker, quit 2009, 20 pack years  Congratulated on smoking cessation  Lung Cancer Counseling and Shared Decision Making Session in an Asymptomatic Smoker/Former Smoker   Carla Malhotra is a 69 year old female without current symptoms of lung cancer.  History   Smoking Status    Never   Smokeless Tobacco    Never        She received information on the importance of adherence to annual lung cancer Low Dose CT (LDCT) screening, the impact of her comorbidities and her ability or willingness to undergo diagnosis and treatment. she qualifies for low dose CT lung cancer screening, however due to her quitting smoking in 2009, she has completed 15 year screening.   We counseled the importance of maintaining cigarette smoking abstinence if she is a former smoker and the importance of smoking cessation if she is a current smoker and we discussed and furnished information about tobacco cessation interventions.     Odin Garzon MD

## 2025-02-19 ENCOUNTER — HOSPITAL ENCOUNTER (OUTPATIENT)
Dept: BONE DENSITY | Age: 70
Discharge: HOME | End: 2025-02-19
Attending: INTERNAL MEDICINE
Payer: MEDICARE

## 2025-02-19 DIAGNOSIS — Z78.0 POSTMENOPAUSAL: ICD-10-CM

## 2025-02-19 PROCEDURE — 77080 DXA BONE DENSITY AXIAL: CPT | Performed by: INTERNAL MEDICINE

## 2025-03-23 ENCOUNTER — HOSPITAL ENCOUNTER (OUTPATIENT)
Age: 70
Discharge: HOME | End: 2025-03-23
Payer: MEDICARE

## 2025-03-23 ENCOUNTER — APPOINTMENT (OUTPATIENT)
Dept: GENERAL RADIOLOGY | Age: 70
End: 2025-03-23
Attending: PHYSICIAN ASSISTANT
Payer: MEDICARE

## 2025-03-23 VITALS
OXYGEN SATURATION: 97 % | RESPIRATION RATE: 22 BRPM | DIASTOLIC BLOOD PRESSURE: 80 MMHG | SYSTOLIC BLOOD PRESSURE: 126 MMHG | HEART RATE: 85 BPM | TEMPERATURE: 99 F

## 2025-03-23 DIAGNOSIS — R09.82 PND (POST-NASAL DRIP): ICD-10-CM

## 2025-03-23 DIAGNOSIS — R05.8 PRODUCTIVE COUGH: Primary | ICD-10-CM

## 2025-03-23 PROCEDURE — 99203 OFFICE O/P NEW LOW 30 MIN: CPT | Performed by: PHYSICIAN ASSISTANT

## 2025-03-23 PROCEDURE — 71046 X-RAY EXAM CHEST 2 VIEWS: CPT | Performed by: PHYSICIAN ASSISTANT

## 2025-03-23 RX ORDER — DEXAMETHASONE 4 MG/1
4 TABLET ORAL ONCE
Status: COMPLETED | OUTPATIENT
Start: 2025-03-23 | End: 2025-03-23

## 2025-03-23 RX ORDER — AZITHROMYCIN 250 MG/1
TABLET, FILM COATED ORAL
Qty: 6 TABLET | Refills: 0 | Status: SHIPPED | OUTPATIENT
Start: 2025-03-23 | End: 2025-03-28

## 2025-03-23 RX ORDER — PREDNISONE 20 MG/1
40 TABLET ORAL DAILY
Qty: 8 TABLET | Refills: 0 | Status: SHIPPED | OUTPATIENT
Start: 2025-03-23 | End: 2025-03-27

## 2025-03-23 NOTE — DISCHARGE INSTRUCTIONS
Please return to the ER/clinic if symptoms worsen. Follow-up with your PCP in 24-48 hours as needed.    The decadron will work in your system the next several days.  You may start the additional prednisone on day 2 or 3 if symptoms persist.  Use your inhaler every 4-6 hours as needed.  Take the Z-Luis as prescribed.  Recommend taking an over the counter antihistamine daily: IE zyrtec/claritin.  Sleep more upright. Use chloraseptic spray to help stop the cough trigger reflex.  Push fluids and gargle with warm saline rinses.   If symptoms persist or worsen i.e. increasing fevers or shortness of breath recommend the emergency room.  Otherwise close follow-up with your PCP for further evaluation and treatment.

## 2025-03-23 NOTE — ED PROVIDER NOTES
Patient Seen in: [facility]      History     Chief Complaint   Patient presents with    Cough/URI     Stated Complaint: cold, cough    Subjective:   HPI    69-year-old female here with complaint of an over 3-day history of bodyaches congestion and a worsening productive cough.  Patient has a history of spontaneous pneumothorax and recurrent bronchitis requiring antibiotics.  Patient defers any viral testing.  Patient denies chest pain, shortness of breath, abdominal pain, nausea, vomiting or diarrhea.  Patient is tolerating p.o. speaking full sentences.  Afebrile      Objective:     Past Medical History:    ALCOHOL USE    Allergic rhinitis    Treat with otc meds    Body piercing    Carpal tunnel syndrome    Colon polyps    Depression    Diverticulitis of colon    left    Hearing loss    History of bone density study    Night sweats    menopausal    Numbness and tingling in hands    upon waking up    Positive tuberculin    Sleep disturbance    Night sweats cause me to wake frequently    Snoring    Spontaneous pneumothorax    Thyroid nodule    right, s/p fna    Wears glasses              The patient's medication list, past medical history and social history elements  as listed in today's nurse's notes are reviewed and agree.   The patient's family history is reviewed and is noncontributory to the presenting problem, except as indicated as above.   Past Surgical History:   Procedure Laterality Date    Colonoscopy  2009    Colonoscopy  10/24/2014    colon polyps, diverticulosis    Colonoscopy      Fna (indicate source below)      thyroid nodule    Kip biopsy stereo nodule 1 site left (cpt=19081)      aprox 10 yrs ago    Kip biopsy stereo nodule 1 site left (cpt=19081)  10/27/2021    fibrocystic changes      ,     Other surgical history      Spontaneous Pneumothorax Repair    Thoracoscopy surg w pleurodesis Left     at age 32 sec to spontaneous pneumothorax, not able to reexpand  with chest tube.    Tonsillectomy  1970    Tubal ligation                  Social History     Socioeconomic History    Marital status:    Occupational History    Occupation: RN   Tobacco Use    Smoking status: Never    Smokeless tobacco: Never    Tobacco comments:     5-7 cigarettes, quit in 2009   Vaping Use    Vaping status: Never Used   Substance and Sexual Activity    Alcohol use: Yes     Alcohol/week: 6.0 standard drinks of alcohol     Types: 4 Glasses of wine, 2 Standard drinks or equivalent per week     Comment: 3 drinks a week    Drug use: No     Social Drivers of Health      [facility], [facility]    Housing Stability              Review of Systems    Positive for stated complaint: cold, chest pain, cough  Other systems are as noted in HPI.  Constitutional and vital signs reviewed.      All other systems reviewed and negative except as noted above.    Physical Exam     ED Triage Vitals [03/23/25 1245]   /80   Pulse 85   Resp 22   Temp 98.6 °F (37 °C)   Temp src Oral   SpO2 97 %   O2 Device None (Room air)       Current Vitals:   Vital Signs  BP: 126/80  Pulse: 85  Resp: 22  Temp: 98.6 °F (37 °C)  Temp src: Oral    Oxygen Therapy  SpO2: 97 %  O2 Device: None (Room air)        Physical Exam  Vitals and nursing note reviewed.   Constitutional:       Appearance: Normal appearance. She is well-developed.   HENT:      Head: Normocephalic.      Jaw: There is normal jaw occlusion.      Right Ear: External ear normal. Tympanic membrane is bulging.      Left Ear: External ear normal. Tympanic membrane is bulging.      Nose: Congestion and rhinorrhea present. Rhinorrhea is clear.      Mouth/Throat:      Lips: Pink.      Mouth: Mucous membranes are moist.      Pharynx: Postnasal drip present.   Eyes:      Conjunctiva/sclera: Conjunctivae normal.      Pupils: Pupils are equal, round, and reactive to light.   Cardiovascular:      Rate and Rhythm: Normal  rate and regular rhythm.      Heart sounds: Normal heart sounds.   Pulmonary:      Effort: Pulmonary effort is normal.      Breath sounds: Decreased breath sounds, wheezing and rhonchi present.   Musculoskeletal:      Cervical back: Normal range of motion and neck supple.   Skin:     General: Skin is warm.   Neurological:      Mental Status: She is alert and oriented to person, place, and time.   Psychiatric:         Behavior: Behavior normal.         Thought Content: Thought content normal.         Judgment: Judgment normal.           ED Course   I personally reviewed the xray images and and saw these findings: no pneumonia  XR CHEST PA + LAT CHEST (CPT=71046)    Result Date: 3/23/2025  PROCEDURE:  XR CHEST PA + LAT CHEST (CPT=71046)  INDICATIONS:  cold, chest pain, cough  COMPARISON:  None.  TECHNIQUE:  PA and lateral chest radiographs were obtained.  PATIENT STATED HISTORY: (As transcribed by Technologist)  Patient complains of a cough since Thursday.  She stated history of spontaneous pneumothorax on her Left side 30 years ago. She adds that she uses an inhailer occasionally.    FINDINGS:  The cardiomediastinal silhouette is within normal limits.  Small left effusion is noted.  Chronic interstitial changes are noted in the lungs.  Postsurgical changes in the left upper chest are noted.            CONCLUSION:  Small left effusion is noted   LOCATION:  [facility]   Dictated by (CST): Alvaro Bauman MD on 3/23/2025 at 1:41 PM     Finalized by (CST): Alvaro Bauman MD on 3/23/2025 at 1:42 PM          NOTE: Patient states that she has an inhaler at home but has not been using it.  Patient does not feel any worse on the left side.  The previous images depict similarity.  Patient also states that she has a history of lung issues requesting Z-Luis as previous so it does not turn into pneumonia.         MDM       Clinical Impression: productive cough/PND  Course of Treatment:   The decadron will work in your system the next  several days.  You may start the additional prednisone on day 2 or 3 if symptoms persist.  Use your inhaler every 4-6 hours as needed.  Take the Z-Veronique as prescribed.  Recommend taking an over the counter antihistamine daily: IE zyrtec/claritin.  Sleep more upright. Use chloraseptic spray to help stop the cough trigger reflex.  Push fluids and gargle with warm saline rinses.   If symptoms persist or worsen i.e. increasing fevers or shortness of breath recommend the emergency room.  Otherwise close follow-up with your PCP for further evaluation and treatment.    The patient is encouraged to return if any concerning symptoms arise. Additional verbal discharge instructions are given and discussed. Discharge medications are discussed. The patient is in good condition throughout the visit today and remains so upon discharge. I discuss the plan of care with the patient, who expresses understanding. All questions and concerns are addressed to the patient's satisfaction prior to discharge today.  Previous conversations with PCP and charts were reviewed.            Disposition and Plan     Clinical Impression:  1. Productive cough    2. PND (post-nasal drip)         Disposition:  Discharge  3/23/2025  1:56 pm    Follow-up:  Niyah Schaeffer MD  [address] [address] [phone]                Medications Prescribed:  Current Discharge Medication List        START taking these medications    Details   azithromycin (ZITHROMAX Z-VERONIQUE) 250 MG Oral Tab 500 mg once followed by 250 mg daily x 4 days  Qty: 6 tablet, Refills: 0      predniSONE 20 MG Oral Tab Take 2 tablets (40 mg total) by mouth daily for 4 days. Start on day 2-3 if symptoms persist  Qty: 8 tablet, Refills: 0                 Supplementary Documentation:

## (undated) NOTE — LETTER
10/27/17        [facility] [address]    4/7/1955      Dear Reyes Ishikawa records indicate that you have outstanding lab work and or testing that was ordered for you and has not yet been completed:          Chuckie Colace

## (undated) NOTE — LETTER
Date: 3/10/2021    Patient Name: Jose Fragoso          To Whom It May Concern:     My patient  Jose Fragoso,  1955 , meets the criteria and qualifies for the [place] 1b phase COVID vaccination.               Sincerely,    Chris Austin MD

## (undated) NOTE — LETTER
10/28/19        [address]      Dear Doreen Martinez,    Our records indicate that you have outstanding lab work and or testing that was ordered for you and has not yet been completed:  Orders Placed This Encounter